# Patient Record
Sex: FEMALE | Race: WHITE | Employment: OTHER | URBAN - METROPOLITAN AREA
[De-identification: names, ages, dates, MRNs, and addresses within clinical notes are randomized per-mention and may not be internally consistent; named-entity substitution may affect disease eponyms.]

---

## 2017-01-31 ENCOUNTER — HOSPITAL ENCOUNTER (OUTPATIENT)
Dept: MAMMOGRAPHY | Age: 72
Discharge: HOME | End: 2017-01-31
Attending: INTERNAL MEDICINE
Payer: MEDICARE

## 2017-01-31 DIAGNOSIS — Z12.31 VISIT FOR SCREENING MAMMOGRAM: ICD-10-CM

## 2017-01-31 PROCEDURE — 77067 SCR MAMMO BI INCL CAD: CPT

## 2017-02-02 ENCOUNTER — HOSPITAL ENCOUNTER (OUTPATIENT)
Dept: LAB | Age: 72
Discharge: HOME | End: 2017-02-02
Payer: MEDICARE

## 2017-02-02 PROCEDURE — 36415 COLL VENOUS BLD VENIPUNCTURE: CPT

## 2017-02-02 PROCEDURE — 84443 ASSAY THYROID STIM HORMONE: CPT

## 2017-02-02 PROCEDURE — 84439 ASSAY OF FREE THYROXINE: CPT

## 2017-02-02 PROCEDURE — 80061 LIPID PANEL: CPT

## 2017-02-02 PROCEDURE — 80053 COMPREHEN METABOLIC PANEL: CPT

## 2017-02-07 ENCOUNTER — OFFICE VISIT (OUTPATIENT)
Dept: INTERNAL MEDICINE CLINIC | Age: 72
End: 2017-02-07

## 2017-02-07 ENCOUNTER — HOSPITAL ENCOUNTER (OUTPATIENT)
Dept: LAB | Age: 72
Discharge: HOME | End: 2017-02-07
Payer: MEDICARE

## 2017-02-07 VITALS
HEIGHT: 63 IN | OXYGEN SATURATION: 98 % | SYSTOLIC BLOOD PRESSURE: 120 MMHG | TEMPERATURE: 99.5 F | RESPIRATION RATE: 16 BRPM | HEART RATE: 79 BPM | BODY MASS INDEX: 36.46 KG/M2 | WEIGHT: 205.8 LBS | DIASTOLIC BLOOD PRESSURE: 64 MMHG

## 2017-02-07 DIAGNOSIS — E03.4 HYPOTHYROIDISM DUE TO ACQUIRED ATROPHY OF THYROID: ICD-10-CM

## 2017-02-07 DIAGNOSIS — Z00.00 ROUTINE GENERAL MEDICAL EXAMINATION AT A HEALTH CARE FACILITY: ICD-10-CM

## 2017-02-07 DIAGNOSIS — Z00.00 MEDICARE ANNUAL WELLNESS VISIT, SUBSEQUENT: Primary | ICD-10-CM

## 2017-02-07 DIAGNOSIS — Z13.39 SCREENING FOR ALCOHOLISM: ICD-10-CM

## 2017-02-07 DIAGNOSIS — E78.2 MIXED HYPERLIPIDEMIA: ICD-10-CM

## 2017-02-07 DIAGNOSIS — F34.1 DYSTHYMIA: ICD-10-CM

## 2017-02-07 DIAGNOSIS — J06.9 VIRAL UPPER RESPIRATORY TRACT INFECTION: ICD-10-CM

## 2017-02-07 DIAGNOSIS — Z71.89 ACP (ADVANCE CARE PLANNING): ICD-10-CM

## 2017-02-07 PROCEDURE — 88175 CYTOPATH C/V AUTO FLUID REDO: CPT | Performed by: INTERNAL MEDICINE

## 2017-02-07 RX ORDER — CODEINE PHOSPHATE AND GUAIFENESIN 10; 100 MG/5ML; MG/5ML
5 SOLUTION ORAL
Qty: 120 ML | Refills: 0 | Status: SHIPPED | OUTPATIENT
Start: 2017-02-07 | End: 2017-04-11

## 2017-02-07 RX ORDER — AZITHROMYCIN 250 MG/1
250 TABLET, FILM COATED ORAL SEE ADMIN INSTRUCTIONS
Qty: 6 TAB | Refills: 0 | Status: SHIPPED | OUTPATIENT
Start: 2017-02-07 | End: 2017-02-10 | Stop reason: SDUPTHER

## 2017-02-07 NOTE — PROGRESS NOTES
HISTORY OF PRESENT ILLNESS  Kaleigh Haywood is a 73 y.o. female. HPI  Dysthymia- cont current dose of lexapro doing well stable   SUBJECTIVE: Kaleigh Haywood is a 73 y.o. female here for follow up of hypothyroidism. Lab Results   Component Value Date/Time    TSH 1.510 02/02/2017 12:36 PM     Thyroid ROS: denies fatigue, weight changes, heat/cold intolerance, bowel/skin changes or CVS symptoms. She started feeling sick around [place] then got better- then went on a cruise and came back now has sinus congestion,chest congestion- no fever or chills- has some ear pain    Subjective: Kaleigh Haywood is a 73 y.o. female with hyperlipidemia. Cardiovascular risk analysis - 73 y.o. female LDL goal is under 130. ROS: taking medications as instructed, no medication side effects noted, no TIA's, no chest pain on exertion, no dyspnea on exertion, no swelling of ankles. Tolerating meds, no myalgias or other side effects noted  New concerns: stable. Review of Systems   Constitutional: Negative for chills, diaphoresis, fever, malaise/fatigue and weight loss. HENT: Negative for congestion, ear pain, hearing loss, sore throat and tinnitus. Eyes: Negative for blurred vision and double vision. Respiratory: Negative for cough, hemoptysis, sputum production, shortness of breath and wheezing. Cardiovascular: Negative for chest pain, palpitations, orthopnea, claudication, leg swelling and PND. Gastrointestinal: Negative for abdominal pain, blood in stool, constipation, diarrhea, heartburn, nausea and vomiting. Genitourinary: Negative for dysuria, flank pain, frequency, hematuria and urgency. Musculoskeletal: Negative for back pain, joint pain, myalgias and neck pain. Skin: Negative. Neurological: Negative for dizziness, tingling, sensory change, speech change, focal weakness, seizures, loss of consciousness, weakness and headaches.    Endo/Heme/Allergies: Negative for environmental allergies and polydipsia. Does not bruise/bleed easily. Psychiatric/Behavioral: Negative for depression, memory loss, substance abuse and suicidal ideas. The patient is not nervous/anxious and does not have insomnia. Physical Exam   Constitutional: She is oriented to person, place, and time. She appears well-developed and well-nourished. HENT:   Head: Normocephalic and atraumatic. Right Ear: External ear normal.   Left Ear: External ear normal.   Nose: Nose normal.   Mouth/Throat: Oropharynx is clear and moist.   Eyes: Conjunctivae and EOM are normal. Pupils are equal, round, and reactive to light. Neck: Normal range of motion. Neck supple. Carotid bruit is not present. No thyromegaly present. Cardiovascular: Normal rate, regular rhythm, S1 normal, S2 normal, normal heart sounds and intact distal pulses. Pulmonary/Chest: Effort normal and breath sounds normal.   Abdominal: Soft. Normal appearance and bowel sounds are normal. There is no hepatosplenomegaly. There is no tenderness. Musculoskeletal: Normal range of motion. Neurological: She is alert and oriented to person, place, and time. Skin: Skin is warm, dry and intact. Psychiatric: She has a normal mood and affect. Her behavior is normal. Judgment and thought content normal.   Nursing note and vitals reviewed. ASSESSMENT and PLAN  Katherine Edmondson was seen today for annual wellness visit and cough.     Diagnoses and all orders for this visit:    Medicare annual wellness visit, subsequent  -     PAP (IMAGE GUIDED), LIQUID-BASED    Mixed hyperlipidemia  -     METABOLIC PANEL, COMPREHENSIVE  -     LIPID PANEL    Dysthymia- doing well on this dose but she isnot feeling well right now    Hypothyroidism due to acquired atrophy of thyroid  -     TSH 3RD GENERATION  -     T4, FREE    Routine general medical examination at a health care facility  -     PAP (IMAGE GUIDED), LIQUID-BASED    Screening for alcoholism    ACP (advance care planning)    Viral upper respiratory tract infection- she has wheeze, fever and fatigue- seems more bacterial as a cause will treat and she will let me know how she is doing  -     azithromycin (ZITHROMAX) 250 mg tablet; Take 1 Tab by mouth See Admin Instructions for 5 days.  -     guaiFENesin-codeine (ROBITUSSIN AC) 100-10 mg/5 mL solution; Take 5 mL by mouth three (3) times daily as needed for Cough. Max Daily Amount: 15 mL. -     CBC W/O DIFF      lab results and schedule of future lab studies reviewed with patient  reviewed diet, exercise and weight control  cardiovascular risk and specific lipid/LDL goals reviewed  reviewed medications and side effects in detail    This is a Subsequent Medicare Annual Wellness Visit providing Personalized Prevention Plan Services (PPPS) (Performed 12 months after initial AWV and PPPS )    I have reviewed the patient's medical history in detail and updated the computerized patient record. History     Past Medical History   Diagnosis Date    Cancer ([facility] [place] [address]      thyroid    Depression     Hyperlipidemia     Unspecified hypothyroidism 2009     thyroid cancer      Past Surgical History   Procedure Laterality Date    Pr thyroidectomy      Hx heent       thyroid    Hx  section      Hx cyst incision and drainage Left      neg     Current Outpatient Prescriptions   Medication Sig Dispense Refill    levothyroxine (SYNTHROID) 175 mcg tablet TAKE ONE TABLET BY MOUTH DAILY BEFORE BREAKFAST 90 Tab 0    naproxen sodium (NAPROSYN) 220 mg tablet Take 220 mg by mouth two (2) times daily (with meals).  escitalopram oxalate (LEXAPRO) 20 mg tablet TAKE ONE TABLET BY MOUTH DAILY 90 Tab 0    atorvastatin (LIPITOR) 10 mg tablet Take 1 Tab by mouth daily. 90 Tab 1    tolterodine ER (DETROL LA) 4 mg ER capsule Take 1 Cap by mouth every other day. 90 Cap 1    phentermine (ADIPEX-P) 37.5 mg tablet Take 1 Tab by mouth every morning.  Max Daily Amount: 37.5 mg. 30 Tab 3  escitalopram oxalate (LEXAPRO) 20 mg tablet Take 1 Tab by mouth daily. 90 Tab 1    multivitamin (ONE A DAY) tablet Take 1 tablet by mouth daily.  cetirizine (ZYRTEC) 10 mg tablet Take  by mouth daily.  fluticasone (FLONASE) 50 mcg/actuation nasal spray 2 Sprays by Both Nostrils route daily. 1 Bottle 4     Allergies   Allergen Reactions    Zetia [Ezetimibe] Rash     Family History   Problem Relation Age of Onset    Cancer Mother     Arthritis-osteo Mother     Breast Cancer Mother [de-identified]    Cancer Sister     Thyroid Disease Sister     Cancer Brother     Heart Failure Maternal Grandmother     Heart Failure Maternal Grandfather     Heart Failure Father      Social History   Substance Use Topics    Smoking status: Never Smoker    Smokeless tobacco: Never Used    Alcohol use 4.2 oz/week     7 Glasses of wine per week     Patient Active Problem List   Diagnosis Code    Depression F32.9    Hypothyroidism E03.9    Hyperlipidemia E78.5    ACP (advance care planning) Z71.89       Depression Risk Factor Screening:     PHQ 2 / 9, over the last two weeks 9/28/2015   Little interest or pleasure in doing things Not at all   Feeling down, depressed or hopeless Not at all   Total Score PHQ 2 0     Alcohol Risk Factor Screening: On any occasion during the past 3 months, have you had more than 3 drinks containing alcohol? Yes    Do you average more than 7 drinks per week? No      Functional Ability and Level of Safety:     Hearing Loss   normal-to-mild    Activities of Daily Living   Self-care. Requires assistance with: no ADLs    Fall Risk     Fall Risk Assessment, last 12 mths 8/9/2016   Able to walk? Yes   Fall in past 12 months? Yes   Fall with injury? No   Number of falls in past 12 months 1   Fall Risk Score 1     Abuse Screen   Patient is not abused    Review of Systems   A comprehensive review of systems was negative except for that written in the HPI.     Physical Examination     Evaluation of Cognitive Function:  Mood/affect:  neutral  Appearance: age appropriate  Family member/caregiver input: doing well  [address] [facility]      Patient Care Team:  Stephanie Bhagat MD as PCP - General    Advice/Referrals/Counseling   Education and counseling provided:  Are appropriate based on today's review and evaluation  End-of-Life planning (with patient's consent)  Pneumococcal Vaccine    Assessment/Plan   Patricia Nam was seen today for annual wellness visit and cough. Diagnoses and all orders for this visit:    Medicare annual wellness visit, subsequent  -     PAP (IMAGE GUIDED), LIQUID-BASED    Mixed hyperlipidemia- tolerating medicine  -     METABOLIC PANEL, COMPREHENSIVE  -     LIPID PANEL    Dysthymia-stable on medicine    Hypothyroidism due to acquired atrophy of thyroid  -     TSH 3RD GENERATION  -     T4, FREE    Routine general medical examination at a health care facility  -     PAP (IMAGE GUIDED), LIQUID-BASED    Screening for alcoholism    ACP (advance care planning)    Viral upper respiratory tract infection  -     azithromycin (ZITHROMAX) 250 mg tablet; Take 1 Tab by mouth See Admin Instructions for 5 days.  -     guaiFENesin-codeine (ROBITUSSIN AC) 100-10 mg/5 mL solution; Take 5 mL by mouth three (3) times daily as needed for Cough. Max Daily Amount: 15 mL. -     CBC W/O DIFF      lab results and schedule of future lab studies reviewed with patient  reviewed diet, exercise and weight control  cardiovascular risk and specific lipid/LDL goals reviewed  reviewed medications and side effects in detail.

## 2017-02-07 NOTE — ACP (ADVANCE CARE PLANNING)
Advance Care Planning (ACP) Provider Note - Comprehensive     Date of ACP Conversation: 02/07/17  Persons included in Conversation:  patient  No flowsheet data found. Discussed with patient- she has an advanced directive- done with - she will get me a copy of it from home.  is medical power of .  SHe is a full code

## 2017-02-09 ENCOUNTER — TELEPHONE (OUTPATIENT)
Dept: INTERNAL MEDICINE CLINIC | Age: 72
End: 2017-02-09

## 2017-02-09 NOTE — TELEPHONE ENCOUNTER
Lilly Armstrong from [facility] lab received a pap from patient. The ICD10 code was incorrect and insurance denied.   Call Lilly Armstrong back at [phone]

## 2017-02-10 DIAGNOSIS — J06.9 VIRAL UPPER RESPIRATORY TRACT INFECTION: ICD-10-CM

## 2017-02-10 NOTE — TELEPHONE ENCOUNTER
Marino Bartlett states she is still not well and Dr. Judie Prakash told her to call in and get another prescription.

## 2017-02-11 RX ORDER — AZITHROMYCIN 250 MG/1
TABLET, FILM COATED ORAL
Qty: 6 TAB | Refills: 0 | Status: SHIPPED | OUTPATIENT
Start: 2017-02-11 | End: 2017-04-11 | Stop reason: ALTCHOICE

## 2017-02-13 RX ORDER — AZITHROMYCIN 250 MG/1
250 TABLET, FILM COATED ORAL SEE ADMIN INSTRUCTIONS
Qty: 6 TAB | Refills: 0 | OUTPATIENT
Start: 2017-02-13 | End: 2017-02-18

## 2017-04-11 ENCOUNTER — OFFICE VISIT (OUTPATIENT)
Dept: INTERNAL MEDICINE CLINIC | Age: 72
End: 2017-04-11

## 2017-04-11 VITALS
BODY MASS INDEX: 37 KG/M2 | HEIGHT: 63 IN | RESPIRATION RATE: 16 BRPM | DIASTOLIC BLOOD PRESSURE: 53 MMHG | TEMPERATURE: 98.1 F | OXYGEN SATURATION: 98 % | WEIGHT: 208.8 LBS | SYSTOLIC BLOOD PRESSURE: 111 MMHG | HEART RATE: 73 BPM

## 2017-04-11 DIAGNOSIS — E78.2 MIXED HYPERLIPIDEMIA: ICD-10-CM

## 2017-04-11 DIAGNOSIS — Z01.810 PREOP CARDIOVASCULAR EXAM: ICD-10-CM

## 2017-04-11 DIAGNOSIS — E03.4 HYPOTHYROIDISM DUE TO ACQUIRED ATROPHY OF THYROID: Primary | ICD-10-CM

## 2017-04-11 NOTE — PROGRESS NOTES
HISTORY OF PRESENT ILLNESS  Maxi Escalante is a 73 y.o. female. HPI     She presents today for a pre op examination. Pt is scheduled to have eye surgery. Pt denies chest pain, shortness of breath, fever, chills, or body aches. She denies dysuria, urgency, or frequency. Pt denies issues with latex or anesthesia in the past.    Reports she had vertigo last week when she sat up after doing yoga. She states that this lasted for a few days and she did not take any medication for this. Hypothyroidism:  Lab Results   Component Value Date/Time    TSH 1.510 02/02/2017 12:36 PM   Thyroid ROS: denies fatigue, weight changes, heat/cold intolerance, bowel/skin changes or CVS symptoms. Hyperlipidemia:  Cardiovascular risk analysis - 73 y.o. female LDL goal is under 130. ROS: taking medications as instructed, no medication side effects noted, no TIA's, no chest pain on exertion, no dyspnea on exertion, no swelling of ankles. Tolerating meds, no myalgias or other side effects noted  New concerns: Stable. Review of Systems   All other systems reviewed and are negative. Physical Exam   Constitutional: She is oriented to person, place, and time. She appears well-developed and well-nourished. HENT:   Head: Normocephalic and atraumatic. Right Ear: External ear normal.   Left Ear: External ear normal.   Nose: Nose normal.   Mouth/Throat: Oropharynx is clear and moist.   Eyes: Conjunctivae and EOM are normal.   Neck: Normal range of motion. Neck supple. Carotid bruit is not present. No thyroid mass and no thyromegaly present. Cardiovascular: Normal rate, regular rhythm, S1 normal, S2 normal, normal heart sounds and intact distal pulses. Pulmonary/Chest: Effort normal and breath sounds normal.   Abdominal: Soft. Normal appearance and bowel sounds are normal. There is no hepatosplenomegaly. There is no tenderness. Musculoskeletal: Normal range of motion.    Neurological: She is alert and oriented to person, place, and time. She has normal strength. No cranial nerve deficit or sensory deficit. Coordination normal.   Skin: Skin is warm, dry and intact. No abrasion and no rash noted. Psychiatric: She has a normal mood and affect. Her behavior is normal. Judgment and thought content normal.   Nursing note and vitals reviewed. ASSESSMENT and PLAN  Nemesio Olsen was seen today for pre-op exam.    Diagnoses and all orders for this visit:    Preop cardiovascular exam  BP at goal and pt has no other symptoms- she is clear to have eye surgery performed. Hypothyroidism due to acquired atrophy of thyroid  Stable-Continue current medications. -     CBC W/O DIFF  -     TSH 3RD GENERATION  -     T4, FREE    Mixed hyperlipidemia  Stable- Continue current medications.  -     LIPID PANEL  -     METABOLIC PANEL, COMPREHENSIVE    I provided pt with lab slips and she will do these in a few months. reviewed medications and side effects in detail     Written by Anabella Caicedo, as dictated by Tiffany Ballesteros MD.     Current diagnosis and concerns discussed with pt at length. Understands risks and benefits or current treatment plan and medications and accepts the treatment and medication with any possible risks. Pt asks appropriate questions which were answered. Pt instructed to call with any concerns or problems.

## 2017-04-11 NOTE — PROGRESS NOTES
Chief Complaint   Patient presents with    Pre-op Exam     eye surg.      Pt states she had vertigo last week

## 2017-04-12 DIAGNOSIS — E03.9 ACQUIRED HYPOTHYROIDISM: ICD-10-CM

## 2017-04-13 RX ORDER — LEVOTHYROXINE SODIUM 175 UG/1
TABLET ORAL
Qty: 90 TAB | Refills: 1 | Status: SHIPPED | OUTPATIENT
Start: 2017-04-13 | End: 2017-11-01 | Stop reason: SDUPTHER

## 2017-04-13 RX ORDER — ESCITALOPRAM OXALATE 20 MG/1
TABLET ORAL
Qty: 90 TAB | Refills: 0 | Status: SHIPPED | OUTPATIENT
Start: 2017-04-13 | End: 2017-07-17 | Stop reason: SDUPTHER

## 2017-07-27 ENCOUNTER — HOSPITAL ENCOUNTER (OUTPATIENT)
Dept: LAB | Age: 72
Discharge: HOME | End: 2017-07-27
Payer: MEDICARE

## 2017-07-27 PROCEDURE — 36415 COLL VENOUS BLD VENIPUNCTURE: CPT

## 2017-07-27 PROCEDURE — 84439 ASSAY OF FREE THYROXINE: CPT

## 2017-07-27 PROCEDURE — 80053 COMPREHEN METABOLIC PANEL: CPT

## 2017-07-27 PROCEDURE — 85027 COMPLETE CBC AUTOMATED: CPT

## 2017-07-27 PROCEDURE — 83036 HEMOGLOBIN GLYCOSYLATED A1C: CPT

## 2017-07-27 PROCEDURE — 84443 ASSAY THYROID STIM HORMONE: CPT

## 2017-07-27 PROCEDURE — 80061 LIPID PANEL: CPT

## 2017-07-28 LAB
ALBUMIN SERPL-MCNC: 4.3 G/DL (ref 3.6–4.8)
ALBUMIN/GLOB SERPL: 1.8 {RATIO} (ref 1.2–2.2)
ALP SERPL-CCNC: 96 IU/L (ref 39–117)
ALT SERPL-CCNC: 23 IU/L (ref 0–32)
AST SERPL-CCNC: 19 IU/L (ref 0–40)
BILIRUB SERPL-MCNC: 0.8 MG/DL (ref 0–1.2)
BUN SERPL-MCNC: 14 MG/DL (ref 8–27)
BUN/CREAT SERPL: 16 (ref 12–28)
CALCIUM SERPL-MCNC: 9.2 MG/DL (ref 8.7–10.3)
CHLORIDE SERPL-SCNC: 103 MMOL/L (ref 96–106)
CHOLEST SERPL-MCNC: 176 MG/DL (ref 100–199)
CO2 SERPL-SCNC: 25 MMOL/L (ref 18–29)
CREAT SERPL-MCNC: 0.88 MG/DL (ref 0.57–1)
ERYTHROCYTE [DISTWIDTH] IN BLOOD BY AUTOMATED COUNT: 13.7 % (ref 12.3–15.4)
GLOBULIN SER CALC-MCNC: 2.4 G/DL (ref 1.5–4.5)
GLUCOSE SERPL-MCNC: 103 MG/DL (ref 65–99)
HCT VFR BLD AUTO: 41.2 % (ref 34–46.6)
HDLC SERPL-MCNC: 66 MG/DL
HGB BLD-MCNC: 13.8 G/DL (ref 11.1–15.9)
INTERPRETATION, 910389: NORMAL
LDLC SERPL CALC-MCNC: 90 MG/DL (ref 0–99)
MCH RBC QN AUTO: 29.4 PG (ref 26.6–33)
MCHC RBC AUTO-ENTMCNC: 33.5 G/DL (ref 31.5–35.7)
MCV RBC AUTO: 88 FL (ref 79–97)
PLATELET # BLD AUTO: 269 X10E3/UL (ref 150–379)
POTASSIUM SERPL-SCNC: 4.4 MMOL/L (ref 3.5–5.2)
PROT SERPL-MCNC: 6.7 G/DL (ref 6–8.5)
RBC # BLD AUTO: 4.69 X10E6/UL (ref 3.77–5.28)
SODIUM SERPL-SCNC: 141 MMOL/L (ref 134–144)
T4 FREE SERPL-MCNC: 1.55 NG/DL (ref 0.82–1.77)
TRIGL SERPL-MCNC: 98 MG/DL (ref 0–149)
TSH SERPL DL<=0.005 MIU/L-ACNC: 1.55 UIU/ML (ref 0.45–4.5)
VLDLC SERPL CALC-MCNC: 20 MG/DL (ref 5–40)
WBC # BLD AUTO: 7.6 X10E3/UL (ref 3.4–10.8)

## 2017-08-03 LAB
HBA1C MFR BLD: 5.9 % (ref 4.8–5.6)
SPECIMEN STATUS REPORT, ROLRST: NORMAL

## 2017-08-17 ENCOUNTER — OFFICE VISIT (OUTPATIENT)
Dept: INTERNAL MEDICINE CLINIC | Age: 72
End: 2017-08-17

## 2017-08-17 ENCOUNTER — HOSPITAL ENCOUNTER (OUTPATIENT)
Dept: GENERAL RADIOLOGY | Age: 72
Discharge: HOME | End: 2017-08-17
Attending: INTERNAL MEDICINE
Payer: MEDICARE

## 2017-08-17 VITALS
WEIGHT: 220 LBS | BODY MASS INDEX: 38.98 KG/M2 | TEMPERATURE: 97.6 F | HEIGHT: 63 IN | HEART RATE: 61 BPM | SYSTOLIC BLOOD PRESSURE: 120 MMHG | DIASTOLIC BLOOD PRESSURE: 56 MMHG | OXYGEN SATURATION: 98 % | RESPIRATION RATE: 12 BRPM

## 2017-08-17 DIAGNOSIS — H83.2X9 VESTIBULAR DISEQUILIBRIUM, UNSPECIFIED LATERALITY: ICD-10-CM

## 2017-08-17 DIAGNOSIS — R06.02 SOB (SHORTNESS OF BREATH): ICD-10-CM

## 2017-08-17 DIAGNOSIS — M25.50 ARTHRALGIA, UNSPECIFIED JOINT: ICD-10-CM

## 2017-08-17 DIAGNOSIS — E78.2 MIXED HYPERLIPIDEMIA: Primary | ICD-10-CM

## 2017-08-17 DIAGNOSIS — F34.1 DYSTHYMIA: ICD-10-CM

## 2017-08-17 DIAGNOSIS — E03.4 HYPOTHYROIDISM DUE TO ACQUIRED ATROPHY OF THYROID: ICD-10-CM

## 2017-08-17 PROCEDURE — 71020 XR CHEST PA LAT: CPT

## 2017-08-17 RX ORDER — NAPROXEN SODIUM 220 MG
220 TABLET ORAL 2 TIMES DAILY WITH MEALS
COMMUNITY

## 2017-08-17 NOTE — PROGRESS NOTES
HISTORY OF PRESENT ILLNESS  Jeane Wallace is a 73 y.o. female. HPI   Presents today for 6 month follow up. Patient reports she has not followed up with dermatology. Patient admits to some vertigo when switching from laying down to standing up. Patient reports she went to [facility] for UTI and chest XR. She states she had an infection. Patient reports she was given a breathing treatment and an inhaler. She states she is back on the inhaler. Patient feels like she is getting better. She states being sick took a lot of energy from her. She notes she is having some SOB and coughs. Patient reports she is noticing arthritis in her hands, knees, and heels. Subjective: Jeane Wallace is a 73 y.o. female with hyperlipidemia. Cardiovascular risk analysis - 73 y.o. female LDL goal is under 130. ROS: taking medications as instructed, no medication side effects noted, no TIA's, no chest pain on exertion, no dyspnea on exertion, no swelling of ankles. Tolerating meds, no myalgias or other side effects noted  New concerns: stable  SUBJECTIVE: Jeane Wallace is a 73 y.o. female here for follow up of hypothyroidism. Lab Results   Component Value Date/Time    TSH 1.550 07/27/2017 08:21 AM     Thyroid ROS: denies fatigue, weight changes, heat/cold intolerance, bowel/skin changes or CVS symptoms. .   Dysthymia- mood is doing well on medicine. She is travelling for her bday and [place]- overall feeling well with exercise  And eating right and taking care of herself- helps just as much with her mood    Review of Systems   All other systems reviewed and are negative. Physical Exam   Constitutional: She is oriented to person, place, and time. She appears well-developed and well-nourished. HENT:   Head: Normocephalic and atraumatic.    Right Ear: External ear normal.   Left Ear: External ear normal.   Nose: Nose normal.   Mouth/Throat: Oropharynx is clear and moist.   Eyes: Conjunctivae and EOM are normal.   Neck: Normal range of motion. Neck supple. Carotid bruit is not present. No thyroid mass and no thyromegaly present. Cardiovascular: Normal rate, regular rhythm, S1 normal, S2 normal, normal heart sounds and intact distal pulses. Pulmonary/Chest: Effort normal and breath sounds normal.   Abdominal: Soft. Normal appearance and bowel sounds are normal. There is no hepatosplenomegaly. There is no tenderness. Musculoskeletal: Normal range of motion. Neurological: She is alert and oriented to person, place, and time. She has normal strength. No cranial nerve deficit or sensory deficit. Coordination normal.   Skin: Skin is warm, dry and intact. No abrasion and no rash noted. Psychiatric: She has a normal mood and affect. Her behavior is normal. Judgment and thought content normal.   Nursing note and vitals reviewed. ASSESSMENT and PLAN  Diagnoses and all orders for this visit:    1. Mixed hyperlipidemia  Cholesterol stable. I do not recommend a change in medication. 2. Hypothyroidism due to acquired atrophy of thyroid  Thyroid stable. I do not recommend a change in medications. 3. Dysthymia  Mood stable. I do not recommend any changes in medication. 4. Vestibular disequilibrium, unspecified laterality  Patient notes positional dizziness from laying down to standing. She will continue to watch. It is not all the time and she is exercising three days a week and feeling strong     5. SOB (shortness of breath)  Patient has been having SOB since fighting an infection. Ordered XR. She is getting better and occasionally needs an inhaler but not all the time   -     XR CHEST PA LAT; Future    6. Arthralgia, unspecified joint  Discussed patient she can take Aleve, but we will need to monitor her kidney function.       lab results and schedule of future lab studies reviewed with patient  reviewed diet, exercise and weight control    Written by Evert Castaneda Heather, as dictated by Cristina Alva MD.     Current diagnosis and concerns discussed with pt at length. Understands risks and benefits or current treatment plan and medications and accepts the treatment and medication with any possible risks. Pt asks appropriate questions which were answered. Pt instructed to call with any concerns or problems.

## 2017-08-18 ENCOUNTER — TELEPHONE (OUTPATIENT)
Dept: INTERNAL MEDICINE CLINIC | Age: 72
End: 2017-08-18

## 2017-08-18 NOTE — LETTER
8/30/2017 11:36 AM 
 
 
 
 
 
 
Ms. Marc Douglass [address] [facility] [place] [id number] Sivan Kim: 
 
I attempted to contact you on August 18th in reference to your result of chest xray. Your results were normal. 
 
Thank you, 
 
 
 
Hayden ALFONSO

## 2017-09-25 ENCOUNTER — OFFICE VISIT (OUTPATIENT)
Dept: INTERNAL MEDICINE CLINIC | Age: 72
End: 2017-09-25

## 2017-09-25 VITALS
RESPIRATION RATE: 14 BRPM | OXYGEN SATURATION: 98 % | WEIGHT: 218.2 LBS | DIASTOLIC BLOOD PRESSURE: 67 MMHG | HEART RATE: 76 BPM | HEIGHT: 63 IN | BODY MASS INDEX: 38.66 KG/M2 | TEMPERATURE: 98.7 F | SYSTOLIC BLOOD PRESSURE: 128 MMHG

## 2017-09-25 DIAGNOSIS — E78.2 MIXED HYPERLIPIDEMIA: ICD-10-CM

## 2017-09-25 DIAGNOSIS — E03.4 HYPOTHYROIDISM DUE TO ACQUIRED ATROPHY OF THYROID: ICD-10-CM

## 2017-09-25 DIAGNOSIS — R06.02 SOB (SHORTNESS OF BREATH): Primary | ICD-10-CM

## 2017-09-25 NOTE — PROGRESS NOTES
HISTORY OF PRESENT ILLNESS  Litzy Langley is a 81 y.o. female. HPI   SOB: Patient reports SOB when going up stairs and other activity. Patient reports she has cut down on exercise since noticing SOB. She states this is an acute change. She states she has trouble taking deep breaths. She states she coughs a lot during the night. She states the coughs feel deep. She states she has been fatigued and wants to rest more. Patient notes this started in August. She denies night sweats. FH: Patient father had 3 heart attacks in life span. Hyperlipidemia:  Cardiovascular risk analysis - 81 y.o. female LDL goal is under 130. ROS: taking medications as instructed, no medication side effects noted, no TIA's, no chest pain on exertion, no dyspnea on exertion, no swelling of ankles. Tolerating meds, no myalgias or other side effects noted  New concerns: Last LDL was 90 and cholesterol total was 176 on 7/27/2017.    hypothyroidism. Lab Results   Component Value Date/Time    TSH 1.550 07/27/2017 08:21 AM     Thyroid ROS: denies fatigue, weight changes, heat/cold intolerance, bowel/skin changes or CVS symptoms. Review of Systems   All other systems reviewed and are negative. Physical Exam   Constitutional: She is oriented to person, place, and time. She appears well-developed and well-nourished. HENT:   Head: Normocephalic and atraumatic. Right Ear: External ear normal.   Left Ear: External ear normal.   Nose: Nose normal.   Mouth/Throat: Oropharynx is clear and moist.   Eyes: Conjunctivae and EOM are normal.   Neck: Normal range of motion. Neck supple. Carotid bruit is not present. No thyroid mass and no thyromegaly present. Cardiovascular: Normal rate, regular rhythm, S1 normal, S2 normal, normal heart sounds and intact distal pulses. Pulmonary/Chest: Effort normal and breath sounds normal.   Abdominal: Soft. Normal appearance and bowel sounds are normal. There is no hepatosplenomegaly.  There is no tenderness. Musculoskeletal: Normal range of motion. Neurological: She is alert and oriented to person, place, and time. She has normal strength. No cranial nerve deficit or sensory deficit. Coordination normal.   Skin: Skin is warm, dry and intact. No abrasion and no rash noted. Psychiatric: She has a normal mood and affect. Her behavior is normal. Judgment and thought content normal.   Nursing note and vitals reviewed. ASSESSMENT and PLAN  Diagnoses and all orders for this visit:    1. SOB (shortness of breath)  Patient will follow up with cardiology. Ordered CT of chest to rule out infection. Ordered pulmonary function test to rule out abnormality with oxygenation levels. -     PULMONARY FUNCTION TEST; Future  -     CT CHEST W CONT; Future    2. Hypothyroidism due to acquired atrophy of thyroid  Thyroid stable. I do not recommend a change in medications. 3. Mixed hyperlipidemia  Stable, patient tolerating meds, no myalgias. I do not recommend any change in medications. lab results and schedule of future lab studies reviewed with patient  reviewed diet, exercise and weight control    Written by Carrie Cornell, as dictated by Kirill Webber MD.     Current diagnosis and concerns discussed with pt at length. Understands risks and benefits or current treatment plan and medications and accepts the treatment and medication with any possible risks. Pt asks appropriate questions which were answered. Pt instructed to call with any concerns or problems.

## 2017-09-27 ENCOUNTER — TELEPHONE (OUTPATIENT)
Dept: INTERNAL MEDICINE CLINIC | Age: 72
End: 2017-09-27

## 2017-09-27 NOTE — TELEPHONE ENCOUNTER
Obtained appt with Dr. Wayne Hunter for Monday Oct 5 at [facility] at 9:20. L/vm to advise pt and requested a return message to verify she had received appt info.

## 2017-10-02 ENCOUNTER — HOSPITAL ENCOUNTER (OUTPATIENT)
Dept: PULMONOLOGY | Age: 72
Discharge: HOME | End: 2017-10-02
Attending: INTERNAL MEDICINE
Payer: MEDICARE

## 2017-10-02 ENCOUNTER — OFFICE VISIT (OUTPATIENT)
Dept: CARDIOLOGY CLINIC | Age: 72
End: 2017-10-02

## 2017-10-02 ENCOUNTER — HOSPITAL ENCOUNTER (OUTPATIENT)
Dept: CT IMAGING | Age: 72
Discharge: HOME | End: 2017-10-02
Attending: INTERNAL MEDICINE
Payer: MEDICARE

## 2017-10-02 VITALS
HEART RATE: 75 BPM | BODY MASS INDEX: 38.41 KG/M2 | DIASTOLIC BLOOD PRESSURE: 70 MMHG | HEIGHT: 63 IN | WEIGHT: 216.8 LBS | OXYGEN SATURATION: 99 % | SYSTOLIC BLOOD PRESSURE: 110 MMHG

## 2017-10-02 VITALS — HEART RATE: 65 BPM

## 2017-10-02 DIAGNOSIS — R06.02 SHORTNESS OF BREATH: ICD-10-CM

## 2017-10-02 DIAGNOSIS — R06.02 SOB (SHORTNESS OF BREATH): ICD-10-CM

## 2017-10-02 DIAGNOSIS — R06.09 DOE (DYSPNEA ON EXERTION): Primary | ICD-10-CM

## 2017-10-02 DIAGNOSIS — E78.5 DYSLIPIDEMIA: ICD-10-CM

## 2017-10-02 DIAGNOSIS — R07.89 CHEST PRESSURE: ICD-10-CM

## 2017-10-02 PROCEDURE — 71260 CT THORAX DX C+: CPT

## 2017-10-02 PROCEDURE — 74011000250 HC RX REV CODE- 250: Performed by: INTERNAL MEDICINE

## 2017-10-02 PROCEDURE — 94060 EVALUATION OF WHEEZING: CPT

## 2017-10-02 PROCEDURE — 74011636320 HC RX REV CODE- 636/320: Performed by: RADIOLOGY

## 2017-10-02 RX ORDER — ALBUTEROL SULFATE 90 UG/1
AEROSOL, METERED RESPIRATORY (INHALATION)
COMMUNITY
End: 2018-08-10 | Stop reason: ALTCHOICE

## 2017-10-02 RX ORDER — ALBUTEROL SULFATE 0.83 MG/ML
2.5 SOLUTION RESPIRATORY (INHALATION)
Status: COMPLETED | OUTPATIENT
Start: 2017-10-02 | End: 2017-10-02

## 2017-10-02 RX ADMIN — ALBUTEROL SULFATE 2.5 MG: 2.5 SOLUTION RESPIRATORY (INHALATION) at 11:06

## 2017-10-02 RX ADMIN — IOPAMIDOL 100 ML: 612 INJECTION, SOLUTION INTRAVENOUS at 11:37

## 2017-10-02 NOTE — PROGRESS NOTES
[address]  [address]  [place], [address] [facility]    Office [phone]  Fax [phone]  Cell [phone]        Darrel Isaacs is a 81 y.o. female referred for evaluation of CRUZ. Consult requested by Lyly Rodriguez MD.      Assessment  Encounter Diagnoses   Name Primary?  Shortness of breath     Dyslipidemia     Chest pressure     CRUZ (dyspnea on exertion) Yes       Recommendations: Darrel Isaacs has persistent CRUZ following episode of \"pnemonia\" some weeks ago. She has atypical chest pain and persistent cough. Her exam is normal with clear lung fields and no obvious murmurs. Her EKG is essentially normal. Cardiacwise, the differential diagnosis includes viral myocarditis, CAD. I doubt she has rheumatic valvular disease. Will evaluate further with echo and exercise cardiolite. Simultaneous pulmonary evaluation is underway. Phone follow up after reviewing tests    Subjective:  She was seen by Dr. Nigel Shaw in . Apparently cardiac cath performed in [place] in  was normal. She has dyslipidemia on statin therapy but no hx of HTN or DM. Patient has noticed CRUZ and fatigue on exertion since July. She also reports chest heaviness while exercising. She formerly was exercising three times a week but recently has not had enough energy. Patient denies any exertional chest pain, palpitations, syncope, orthopnea, edema or paroxysmal nocturnal dyspnea. She notes she went to [facility] for a bladder infection a few weeks ago, had a CXR and was informed she had pneumonia. She continues to have a residual, productive cough. She also notes she had rheumatic fever when she was 7. Father had 3 MI before dying at age 58    Cardiac risk factors   HTN no  DM no  Smoking no  Family hx of CAD yes -father  of 2rd MI at 58    Cardiac testing  No specialty comments available.     Past Medical History:   Diagnosis Date    Cancer ([facility] [address]     thyroid    Depression     Hyperlipidemia     Unspecified hypothyroidism 1/20/2009    thyroid cancer        Current Outpatient Prescriptions   Medication Sig Dispense Refill    albuterol (VENTOLIN HFA) 90 mcg/actuation inhaler Take  by inhalation.  naproxen sodium (ALEVE) 220 mg tablet Take 220 mg by mouth two (2) times daily (with meals).  tolterodine ER (DETROL LA) 4 mg ER capsule TAKE ONE CAPSULE BY MOUTH EVERY OTHER DAY 45 Cap 5    levothyroxine (SYNTHROID) 175 mcg tablet TAKE ONE TABLET BY MOUTH DAILY BEFORE BREAKFAST 90 Tab 1    atorvastatin (LIPITOR) 10 mg tablet Take 1 Tab by mouth daily. (Patient taking differently: Take 10 mg by mouth daily. Every other day) 90 Tab 1    escitalopram (LEXAPRO) 20 mg tablet Take 1 Tab by mouth daily for 90 days. 90 Tab 3       Allergies   Allergen Reactions    Zetia [Ezetimibe] Rash          Review of Systems  Constitutional: Negative for fever, chills, and diaphoresis. Positive for fatigue. Respiratory: Negative for hemoptysis, and wheezing. Positive for CRUZ, productive cough. Cardiovascular: Negative for chest pain, palpitations, orthopnea, claudication, leg swelling and PND. Gastrointestinal: Negative for heartburn, nausea, vomiting, blood in stool and melena. Genitourinary: Negative for dysuria and flank pain. Musculoskeletal: Negative for joint pain and back pain. Skin: Negative for rash. Neurological: Negative for focal weakness, seizures, loss of consciousness, weakness and headaches. Endo/Heme/Allergies: Does not bruise/bleed easily. Psychiatric/Behavioral: Negative for memory loss. The patient does not have insomnia.       Physical Exam    Visit Vitals    /70 (BP 1 Location: Left arm, BP Patient Position: Sitting)    Pulse 75    Ht 5' 3\" (1.6 m)    Wt 216 lb 12.8 oz (98.3 kg)    SpO2 99%    BMI 38.4 kg/m2     Wt Readings from Last 3 Encounters:   10/02/17 216 lb 12.8 oz (98.3 kg)   09/25/17 218 lb 3.2 oz (99 kg) 08/17/17 220 lb (99.8 kg)      General - well developed well nourished  Neck - JVP normal, thyroid nl  Cardiac - normal S1, S2, no murmurs, rubs or gallops.  No clicks  Vascular - carotids without bruits, radials, femorals and pedal pulses equal bilateral  Lungs - clear to auscultation bilaterals, no rales, wheezing or rhonchi  Abd - soft nontender, no HSM, no abd bruits  Extremities - no edema  Skin - no rash  Neuro - nonfocal  Psych - normal mood and affect      Cardiographics  EKG 10/2/17- SR, incomplete RBBB, nonspecific lateral T wave flattening, no significant change from 3/4/13    Written by Silvia Olsen, as dictated by Isabela Hopper MD.  Christina Magana

## 2017-10-02 NOTE — MR AVS SNAPSHOT
Visit Information Date & Time Provider Department Dept. Phone Encounter #  
 10/2/2017  9:20 AM Artur Huerta MD [facility] Yvette Rocha [phone] [id number] Your Appointments 2/8/2018  9:15 AM  
Medicare Physical with Leatha Paige MD  
[facility] [address]) Appt Note: CPE [facility] ELDER 09/21/2017 please send lab forms to [facility]; medicare wellness [address] [facility] [phone]  
[phone]  
  
   
 [address] [facility] [address] Upcoming Health Maintenance Date Due Pneumococcal 65+ Low/Medium Risk (2 of 2 - PPSV23) 3/4/2016 INFLUENZA AGE 9 TO ADULT 8/1/2017 MEDICARE YEARLY EXAM 2/8/2018 BREAST CANCER SCRN MAMMOGRAM 1/31/2019 GLAUCOMA SCREENING Q2Y 5/11/2019 COLONOSCOPY 9/10/2020 DTaP/Tdap/Td series (2 - Td) 9/28/2025 Allergies as of 10/2/2017  Review Complete On: 10/2/2017 By: Pau Santo  
  
 Severity Noted Reaction Type Reactions Zetia [Ezetimibe]  04/11/2011    Rash Current Immunizations  Never Reviewed Name Date H1N1 FLU VACCINE 2/10/2010 Hepatitis A Vaccine 5/21/2009, 11/1/2008 Hepatitis B Vaccine 5/21/2009, 11/1/2008 Influenza Vaccine 11/3/2016 Influenza Vaccine (Quad) PF 9/28/2015 Influenza Vaccine Split 10/3/2012, 10/12/2011, 10/13/2010 Influenza Vaccine Whole 11/1/2008 Pneumococcal Vaccine (Unspecified Type) 3/4/2011 TD Vaccine 11/1/2008 Tdap 9/28/2015 Zoster Vaccine, Live 11/4/2013 Not reviewed this visit You Were Diagnosed With   
  
 Codes Comments Shortness of breath    -  Primary ICD-10-CM: R06.02 
ICD-9-CM: 786.05 Dyslipidemia     ICD-10-CM: E78.5 ICD-9-CM: 272.4 Vitals BP Pulse Height(growth percentile) Weight(growth percentile) SpO2 BMI  
 110/70 (BP 1 Location: Left arm, BP Patient Position: Sitting) 75 5' 3\" (1.6 m) 216 lb 12.8 oz (98.3 kg) 99% 38.4 kg/m2 OB Status Smoking Status Postmenopausal Never Smoker Vitals History BMI and BSA Data Body Mass Index Body Surface Area  
 38.4 kg/m 2 2.09 m 2 Preferred Pharmacy Pharmacy Name Phone [facility] [address] [phone] Your Updated Medication List  
  
   
This list is accurate as of: 10/2/17  9:56 AM.  Always use your most recent med list.  
  
  
  
  
 ALEVE 220 mg tablet Generic drug:  naproxen sodium Take 220 mg by mouth two (2) times daily (with meals). atorvastatin 10 mg tablet Commonly known as:  LIPITOR Take 1 Tab by mouth daily. escitalopram oxalate 20 mg tablet Commonly known as:  Arther Putty Take 1 Tab by mouth daily for 90 days. levothyroxine 175 mcg tablet Commonly known as:  SYNTHROID  
TAKE ONE TABLET BY MOUTH DAILY BEFORE BREAKFAST  
  
 tolterodine ER 4 mg ER capsule Commonly known as:  DETROL LA  
TAKE ONE CAPSULE BY MOUTH EVERY OTHER DAY  
  
 VENTOLIN HFA 90 mcg/actuation inhaler Generic drug:  albuterol Take  by inhalation. We Performed the Following AMB POC EKG ROUTINE W/ 12 LEADS, INTER & REP [54093 CPT(R)] To-Do List   
 10/02/2017 10:30 AM  
(Arrive by 10:00 AM) Appointment with PULMONARY LAB [facility] at [facility] PULMONARY LAB ([phone]) 1. Bring list of current medications or bag medicines. 2. No breathing medicines 6 hours before the procedure. 3. Patient should eat light the day of procedure. 10/02/2017 12:00 PM  
  Appointment with [facility] CT 1 at [facility] RAD CT ([phone]) CONTRAST STUDY: 1. The patient should not eat solid food four hours before the appointment but should be encouraged to drink clear liquids. 2. The patient will require IV access for contrast administration.  3. The patient should not take  Ibuprofen (Advil, Motrin, etc.) and Naproxen Sodium (Aleve, etc.)  on the day of the exam. Stopping non-steroidal anti-inflammatory agents (NSAIDs) like Ibuprofen decreases the risk of kidney damage from the x-ray contrast (dye). 4. Bring any non [facility] facility films/images pertaining to the area of interest with you on the day of appointment. 5. Bring current lab work if available(within last 90 days CMP) ***If scheduled at [facility], iSTAT is not available, labs will need to be done before appointment*** 6. Check in at registration at least 30 minutes before appt time unless you were instructed to do otherwise. 7. If you have to drink oral contrast please pick it up any weekday prior to your appointment, if you cannot please check in 2 hrs  before appt time. Introducing [facility]! Dear Delmi Monahan: Thank you for requesting a [employer] account. Our records indicate that you already have an active [employer] account. You can access your account anytime at [email or web address]. [employer]/[facility] Did you know that you can access your hospital and ER discharge instructions at any time in [employer]? You can also review all of your test results from your hospital stay or ER visit. Additional Information If you have questions, please visit the Frequently Asked Questions section of the [employer] website at [email or web address]. [email or web address]/. Remember, [employer] is NOT to be used for urgent needs. For medical emergencies, dial 911. Now available from your iPhone and Android! Please provide this summary of care documentation to your next provider. Your primary care clinician is listed as Kelby Sloan. If you have any questions after today's visit, please call [phone].

## 2017-10-06 NOTE — PROCEDURES
[facility]   [address] [facility], [address]   [address] [facility]       Name:  Marylu Hamilton   MR#:  [id number]   :  1945   Account #:  [de-identified]    Date of Procedure:     Date of Adm:  10/02/2017       FEV1 to FVC ratio is 68 with an FEV1 of 88% with a 10% change with   bronchodilators which is not significant. All in all, patient qualifies for   mild obstructive disease. Flow volume loop is consistent with this.          Adalberto Nageotte, MD Armenta Dull / Guanako Ochoa   D:  10/06/2017   13:32   T:  10/06/2017   13:51   Job #:  [id number]

## 2017-10-13 ENCOUNTER — CLINICAL SUPPORT (OUTPATIENT)
Dept: CARDIOLOGY CLINIC | Age: 72
End: 2017-10-13

## 2017-10-13 DIAGNOSIS — R07.89 CHEST PRESSURE: Primary | ICD-10-CM

## 2017-10-13 DIAGNOSIS — R06.02 SHORTNESS OF BREATH: ICD-10-CM

## 2017-10-13 DIAGNOSIS — R07.89 CHEST PRESSURE: ICD-10-CM

## 2017-10-13 DIAGNOSIS — R06.09 DOE (DYSPNEA ON EXERTION): ICD-10-CM

## 2017-10-13 NOTE — PROGRESS NOTES
See scanned report. Dr. Ebonie Olea ordered study and Dr. Ebonie Olea read study. ID verified per protocol. Test and risks explained. Consent signed after all patient questions answered. Patient developed severe dyspnea during test. At 5 minutes in recvoery, patient without dyspnea or other complaints voiced.

## 2017-10-18 ENCOUNTER — TELEPHONE (OUTPATIENT)
Dept: CARDIOLOGY CLINIC | Age: 72
End: 2017-10-18

## 2017-10-18 NOTE — TELEPHONE ENCOUNTER
Cardiac testing  Echo 10/13/17 - EF 63%. No WMA. G1DD. Exercise Cardiolite 10/13/17 - 6:00. Normal perfusion. EF 76%    Ms. Governor Reyes was seen as a new patient by Dr. Callie Russo on 10/2/17. Referred by Dr. Karlene Chacko for evaluation of CRUZ. I have left a voicemail for her to call us back to review normal Echo and stress imaging.

## 2017-10-20 ENCOUNTER — TELEPHONE (OUTPATIENT)
Dept: CARDIOLOGY CLINIC | Age: 72
End: 2017-10-20

## 2017-10-23 ENCOUNTER — TELEPHONE (OUTPATIENT)
Dept: INTERNAL MEDICINE CLINIC | Age: 72
End: 2017-10-23

## 2017-10-23 NOTE — TELEPHONE ENCOUNTER
Please call her I was thinking about daily once a day inhaler where she takes it to help her overall breathing and fatigue- something like advair or symbicort if she was willing- she can do it once a day and see if it improves? And then she can have the albuterol as needed if she ever gets winded during exercise. . Does that work ?

## 2017-10-23 NOTE — TELEPHONE ENCOUNTER
----- Message from Aidanantonietta Neriica sent at 10/23/2017 12:56 PM EDT -----  Regarding: RE:PFT  Contact: [phone]  I don't know if the last message went through. I am willing to try anything you suggest.  Is the albuterol prescribed PRN ? I am much better, but still tire more quickly than I want to on walks. If I push through the initial shortness of breath, I do get better. Overall, I want to get back to a better routine since I have not been as active recently as in the last couple of years. I hate the hard work, but it pays off. I know losing weight would make life easier but it is just so hard to do. However,  I'm glad to know it isn't heart or any of the other things we tested. Thanks for being so thorough.  ----- Message -----  From: Alexa Harris MD  Sent: 10/23/2017 12:29 PM EDT  To: Coco López  Subject: RE:PFT    So on October 9 th I wrote about the pulmonary function tests ( the breathing test)- this is the only thing that has shown some slight abnormality with mild reactive airway disease for which an inhaler could help? I wanted to make sure the heart tests were done first and CT was normal which they are. SO let us see if an inhaler could help with the breathing? Thoughts on trying a daily medicine for this     ----- Message -----     From: Coco López     Sent: 10/23/2017 10:56 AM EDT       To: Alexa Harris MD  Subject: RE:PFT    Thanks for responding. I am coughing more-which I think is good because it seems to clear the chest more than it did before. I haven't been able to follow the messages,  I\"ve gotten emails about test results for the chest MRI, and a phone call that the heart was normal but no details,   Other messages have been surveys but no overall assessment. Could you just give me an overview of the findings or tell me where to look for them. I  would like to try the inhaler.   I am feeling much better but don't want to walk because I get out of breath. Do you have a cure for being lazy (just kidding)  Thanks so much, Cassy Johnson  ----- Message -----  From: Raul Haines MD  Sent: 10/23/2017  4:58 AM EDT  To: Ninoska Ely  Subject: RE:PFT    The hiatal hernia is really small so I really do not think that is contributing to any discomfort. The inhalers could help possibly? Do you want to try or just keep working on exercise and see how you do. The lung function changes are very mild but maybe an inhaler could make a difference in breathing? Let me know    ----- Message -----     From: Ninoska Ely     Sent: 10/22/2017  1:11 PM EDT       To: Raul Haines MD  Subject: RE:PFT    The heart tests came back normal.  I am feeling better. Any other recommendations? I guess the hiatal hernia indicates losing weight is recommended and the chest has probably healed itself, but I do get winded with exercise. I guess that is normal.   ----- Message -----  From: Raul Haines MD  Sent: 10/9/2017  9:33 PM EDT  To: Ninoska Ely  Subject: PFT    This shows very mild obstructive disease in lungs which we could use an inhaler if we wanted to  want to wait for heart eval to be done and then let me know? ?

## 2017-10-25 ENCOUNTER — TELEPHONE (OUTPATIENT)
Dept: INTERNAL MEDICINE CLINIC | Age: 72
End: 2017-10-25

## 2017-10-25 RX ORDER — FLUTICASONE PROPIONATE AND SALMETEROL 100; 50 UG/1; UG/1
1 POWDER RESPIRATORY (INHALATION) 2 TIMES DAILY
Qty: 1 INHALER | Refills: 5 | Status: SHIPPED | OUTPATIENT
Start: 2017-10-25 | End: 2017-11-29 | Stop reason: SDUPTHER

## 2017-10-25 NOTE — TELEPHONE ENCOUNTER
----- Message from Son Welch sent at 10/24/2017  5:17 PM EDT -----  Regarding: Dr Cochran/Telephone  Pt attempted to return your call @5:12 pm.  Please call her at your earliest convenience    Best contact [phone] (M)  [phone] (H)

## 2017-10-25 NOTE — TELEPHONE ENCOUNTER
Contacted pt and advised of daily inhaler. Pt also inquiring in reference to diagnosis that was the conclusion of testing. Explained the 'mild obstructive disease' shown on PFT and physician 'shortness of breath' diagnosis in chart. Pt understood and will use daily inhaler. May want to consider Advair as it is normally covered prior to Symbicort.

## 2017-11-01 DIAGNOSIS — F32.A DEPRESSION: ICD-10-CM

## 2017-11-01 DIAGNOSIS — E78.2 MIXED HYPERLIPIDEMIA: ICD-10-CM

## 2017-11-01 DIAGNOSIS — E03.9 ACQUIRED HYPOTHYROIDISM: ICD-10-CM

## 2017-11-01 DIAGNOSIS — N39.498 OTHER URINARY INCONTINENCE: ICD-10-CM

## 2017-11-02 RX ORDER — TOLTERODINE 4 MG/1
4 CAPSULE, EXTENDED RELEASE ORAL DAILY
Qty: 30 CAP | Refills: 3 | Status: SHIPPED | OUTPATIENT
Start: 2017-11-02 | End: 2018-02-10 | Stop reason: SDUPTHER

## 2017-11-02 RX ORDER — ATORVASTATIN CALCIUM 10 MG/1
10 TABLET, FILM COATED ORAL DAILY
Qty: 90 TAB | Refills: 0 | Status: SHIPPED | OUTPATIENT
Start: 2017-11-02 | End: 2018-02-10 | Stop reason: SDUPTHER

## 2017-11-02 RX ORDER — ESCITALOPRAM OXALATE 20 MG/1
20 TABLET ORAL DAILY
Qty: 90 TAB | Refills: 0 | Status: SHIPPED | OUTPATIENT
Start: 2017-11-02 | End: 2018-02-10 | Stop reason: SDUPTHER

## 2017-11-02 RX ORDER — LEVOTHYROXINE SODIUM 175 UG/1
175 TABLET ORAL
Qty: 90 TAB | Refills: 0 | Status: SHIPPED | OUTPATIENT
Start: 2017-11-02 | End: 2018-02-10 | Stop reason: SDUPTHER

## 2017-11-02 NOTE — TELEPHONE ENCOUNTER
From: Desiree Koenig  To:  Jessica Ulrich MD  Sent: 11/1/2017 10:37 AM EDT  Subject: Medication Renewal Request    Original authorizing provider: MD Desiree Brumfield would like a refill of the following medications:  escitalopram (LEXAPRO) 20 mg tablet Jessica Ulrich MD]  atorvastatin (LIPITOR) 10 mg tablet Jessica Ulrich MD]  levothyroxine (SYNTHROID) 175 mcg tablet Jessica Ulrich MD]  tolterodine ER (DETROL LA) 4 mg ER capsule Jessica Ulrich MD]    Preferred pharmacy: [address] [place], [place]:

## 2017-11-29 RX ORDER — FLUTICASONE PROPIONATE AND SALMETEROL 100; 50 UG/1; UG/1
1 POWDER RESPIRATORY (INHALATION) 2 TIMES DAILY
Qty: 3 INHALER | Refills: 1
Start: 2017-11-29 | End: 2019-05-07

## 2018-01-24 ENCOUNTER — TELEPHONE (OUTPATIENT)
Dept: INTERNAL MEDICINE CLINIC | Age: 73
End: 2018-01-24

## 2018-01-24 DIAGNOSIS — R73.02 GLUCOSE INTOLERANCE (IMPAIRED GLUCOSE TOLERANCE): ICD-10-CM

## 2018-01-24 DIAGNOSIS — E03.4 HYPOTHYROIDISM DUE TO ACQUIRED ATROPHY OF THYROID: ICD-10-CM

## 2018-01-24 DIAGNOSIS — E78.2 MIXED HYPERLIPIDEMIA: Primary | ICD-10-CM

## 2018-01-24 NOTE — TELEPHONE ENCOUNTER
----- Message from Coni Sawyer sent at 1/24/2018 11:55 AM EST -----  Regarding: Non-Urgent Medical Question  Contact: [phone]  I have an upcoming appointment on Feb. 7th but I Didn't get the forms for the lab tests last time. Could you please send them to me so I can get them done before my appt? My address is [address] [place] [address], [address] [place] [address]

## 2018-02-02 ENCOUNTER — HOSPITAL ENCOUNTER (OUTPATIENT)
Dept: MAMMOGRAPHY | Age: 73
Discharge: HOME | End: 2018-02-02
Attending: INTERNAL MEDICINE
Payer: MEDICARE

## 2018-02-02 ENCOUNTER — HOSPITAL ENCOUNTER (OUTPATIENT)
Dept: LAB | Age: 73
Discharge: HOME | End: 2018-02-02
Payer: MEDICARE

## 2018-02-02 DIAGNOSIS — Z12.39 SCREENING BREAST EXAMINATION: ICD-10-CM

## 2018-02-02 PROCEDURE — 80053 COMPREHEN METABOLIC PANEL: CPT

## 2018-02-02 PROCEDURE — 80061 LIPID PANEL: CPT

## 2018-02-02 PROCEDURE — 83036 HEMOGLOBIN GLYCOSYLATED A1C: CPT

## 2018-02-02 PROCEDURE — 84439 ASSAY OF FREE THYROXINE: CPT

## 2018-02-02 PROCEDURE — 84443 ASSAY THYROID STIM HORMONE: CPT

## 2018-02-02 PROCEDURE — 36415 COLL VENOUS BLD VENIPUNCTURE: CPT

## 2018-02-02 PROCEDURE — 77067 SCR MAMMO BI INCL CAD: CPT

## 2018-02-03 LAB
ALBUMIN SERPL-MCNC: 4.2 G/DL (ref 3.5–4.8)
ALBUMIN/GLOB SERPL: 1.9 {RATIO} (ref 1.2–2.2)
ALP SERPL-CCNC: 90 IU/L (ref 39–117)
ALT SERPL-CCNC: 21 IU/L (ref 0–32)
AST SERPL-CCNC: 23 IU/L (ref 0–40)
BILIRUB SERPL-MCNC: 0.8 MG/DL (ref 0–1.2)
BUN SERPL-MCNC: 10 MG/DL (ref 8–27)
BUN/CREAT SERPL: 13 (ref 12–28)
CALCIUM SERPL-MCNC: 9.5 MG/DL (ref 8.7–10.3)
CHLORIDE SERPL-SCNC: 101 MMOL/L (ref 96–106)
CHOLEST SERPL-MCNC: 195 MG/DL (ref 100–199)
CO2 SERPL-SCNC: 25 MMOL/L (ref 18–29)
CREAT SERPL-MCNC: 0.78 MG/DL (ref 0.57–1)
EST. AVERAGE GLUCOSE BLD GHB EST-MCNC: 117 MG/DL
GFR SERPLBLD CREATININE-BSD FMLA CKD-EPI: 77 ML/MIN/1.73
GFR SERPLBLD CREATININE-BSD FMLA CKD-EPI: 89 ML/MIN/1.73
GLOBULIN SER CALC-MCNC: 2.2 G/DL (ref 1.5–4.5)
GLUCOSE SERPL-MCNC: 108 MG/DL (ref 65–99)
HBA1C MFR BLD: 5.7 % (ref 4.8–5.6)
HDLC SERPL-MCNC: 65 MG/DL
INTERPRETATION, 910389: NORMAL
LDLC SERPL CALC-MCNC: 113 MG/DL (ref 0–99)
POTASSIUM SERPL-SCNC: 4.6 MMOL/L (ref 3.5–5.2)
PROT SERPL-MCNC: 6.4 G/DL (ref 6–8.5)
SODIUM SERPL-SCNC: 140 MMOL/L (ref 134–144)
T4 FREE SERPL-MCNC: 1.15 NG/DL (ref 0.82–1.77)
TRIGL SERPL-MCNC: 87 MG/DL (ref 0–149)
TSH SERPL DL<=0.005 MIU/L-ACNC: 3.02 UIU/ML (ref 0.45–4.5)
VLDLC SERPL CALC-MCNC: 17 MG/DL (ref 5–40)

## 2018-02-08 ENCOUNTER — OFFICE VISIT (OUTPATIENT)
Dept: INTERNAL MEDICINE CLINIC | Age: 73
End: 2018-02-08

## 2018-02-08 VITALS
TEMPERATURE: 98.1 F | HEIGHT: 63 IN | HEART RATE: 72 BPM | WEIGHT: 220.6 LBS | SYSTOLIC BLOOD PRESSURE: 139 MMHG | BODY MASS INDEX: 39.09 KG/M2 | OXYGEN SATURATION: 98 % | DIASTOLIC BLOOD PRESSURE: 66 MMHG | RESPIRATION RATE: 14 BRPM

## 2018-02-08 DIAGNOSIS — E03.4 HYPOTHYROIDISM DUE TO ACQUIRED ATROPHY OF THYROID: ICD-10-CM

## 2018-02-08 DIAGNOSIS — F34.1 DYSTHYMIA: ICD-10-CM

## 2018-02-08 DIAGNOSIS — Z00.00 MEDICARE ANNUAL WELLNESS VISIT, SUBSEQUENT: Primary | ICD-10-CM

## 2018-02-08 DIAGNOSIS — R73.02 GLUCOSE INTOLERANCE (IMPAIRED GLUCOSE TOLERANCE): ICD-10-CM

## 2018-02-08 DIAGNOSIS — R06.09 DOE (DYSPNEA ON EXERTION): ICD-10-CM

## 2018-02-08 DIAGNOSIS — E78.2 MIXED HYPERLIPIDEMIA: ICD-10-CM

## 2018-02-08 RX ORDER — FLUTICASONE PROPIONATE AND SALMETEROL 250; 50 UG/1; UG/1
1 POWDER RESPIRATORY (INHALATION) 2 TIMES DAILY
Qty: 1 INHALER | Refills: 3 | Status: SHIPPED | OUTPATIENT
Start: 2018-02-08 | End: 2018-04-02 | Stop reason: SDUPTHER

## 2018-02-08 NOTE — PATIENT INSTRUCTIONS

## 2018-02-08 NOTE — PROGRESS NOTES
HISTORY OF PRESENT ILLNESS  Taye Medina is a 81 y.o. female. HPI   She is going SW in the fall- doing a lot of art shows - [employer]     Once in awhile when she swallows and feels like it gets down part way and feels like throwing up;     SUBJECTIVE: Taye Meidna is a 81 y.o. female here for follow up of hypothyroidism. Lab Results   Component Value Date/Time    TSH 3.020 02/02/2018 10:19 AM     Thyroid ROS: denies fatigue, weight changes, heat/cold intolerance, bowel/skin changes or CVS symptoms. Dysthymia- cont with lexapro as doing and feeling well on it  She does feel arthritis sometimes in her hands and ankles and she take aleve every day in morning and sometimes at night   Subjective: Taye Medina is a 81 y.o. female with hyperlipidemia. Cardiovascular risk analysis - 81 y.o. female LDL goal is under 130. ROS: taking medications as instructed, no medication side effects noted, no TIA's, no chest pain on exertion, no dyspnea on exertion, no swelling of ankles. Tolerating meds, no myalgias or other side effects noted  New concerns: she did have a cardiac work up last fall with normal stress test.     Sob- there does seem to be some mild obstructive disease on PFT so we have tried some advair but she does not feel like that has been helping as much and seems to be having a hard time moving around    She has really conquered her diet and is eating right and eating for health - good recepies- eating veggies with  book     Review of Systems   Constitutional: Negative for chills, diaphoresis, fever, malaise/fatigue and weight loss. HENT: Negative for congestion, ear pain, hearing loss, sore throat and tinnitus. Eyes: Negative for blurred vision and double vision. Respiratory: Negative for cough, hemoptysis, sputum production, shortness of breath and wheezing. Cardiovascular: Negative for chest pain, palpitations, orthopnea, claudication, leg swelling and PND. Gastrointestinal: Negative for abdominal pain, blood in stool, constipation, diarrhea, heartburn, nausea and vomiting. Genitourinary: Negative for dysuria, flank pain, frequency, hematuria and urgency. Musculoskeletal: Negative for back pain, joint pain, myalgias and neck pain. Skin: Negative. Neurological: Negative for dizziness, tingling, sensory change, speech change, focal weakness, seizures, loss of consciousness, weakness and headaches. Endo/Heme/Allergies: Negative for environmental allergies and polydipsia. Does not bruise/bleed easily. Psychiatric/Behavioral: Negative for depression, memory loss, substance abuse and suicidal ideas. The patient is not nervous/anxious and does not have insomnia. Physical Exam   Constitutional: She is oriented to person, place, and time. She appears well-developed and well-nourished. HENT:   Head: Normocephalic and atraumatic. Right Ear: External ear normal.   Left Ear: External ear normal.   Nose: Nose normal.   Mouth/Throat: Oropharynx is clear and moist.   Eyes: Conjunctivae and EOM are normal. Pupils are equal, round, and reactive to light. Neck: Normal range of motion. Neck supple. Carotid bruit is not present. No thyromegaly present. Cardiovascular: Normal rate, regular rhythm, S1 normal, S2 normal, normal heart sounds and intact distal pulses. Pulmonary/Chest: Effort normal and breath sounds normal.   Abdominal: Soft. Normal appearance and bowel sounds are normal. There is no hepatosplenomegaly. There is no tenderness. Musculoskeletal: Normal range of motion. Neurological: She is alert and oriented to person, place, and time. Skin: Skin is warm, dry and intact. Psychiatric: She has a normal mood and affect. Her behavior is normal. Judgment and thought content normal.   Nursing note and vitals reviewed. ASSESSMENT and PLAN  Diagnoses and all orders for this visit:    1. Medicare annual wellness visit, subsequent    2.  Mixed hyperlipidemia- cont current dose of medicine stable    3. Dysthymia-stable at current dose    4. Hypothyroidism due to acquired atrophy of thyroid-stable on current dose of medicine    5. CRUZ (dyspnea on exertion)- we are going to refer to pulmonary as she is still feeling sob and not better from it- we are also going to try an increased dose of advair      lab results and schedule of future lab studies reviewed with patient  reviewed diet, exercise and weight control  cardiovascular risk and specific lipid/LDL goals reviewed  reviewed medications and side effects in detail    This is a Subsequent Medicare Annual Wellness Exam (AWV) (Performed 12 months after IPPE or effective date of Medicare Part B enrollment)    I have reviewed the patient's medical history in detail and updated the computerized patient record. History     Past Medical History:   Diagnosis Date    Cancer ([facility] [address]     thyroid    Depression     Hyperlipidemia     Unspecified hypothyroidism 2009    thyroid cancer      Past Surgical History:   Procedure Laterality Date    HX  SECTION      HX CYST INCISION AND DRAINAGE Left     neg    HX HEENT      thyroid    THYROIDECTOMY       Current Outpatient Prescriptions   Medication Sig Dispense Refill    fluticasone-salmeterol (ADVAIR) 100-50 mcg/dose diskus inhaler Take 1 Puff by inhalation two (2) times a day. 3 Inhaler 1    naproxen sodium (ALEVE) 220 mg tablet Take 220 mg by mouth two (2) times daily (with meals).  tolterodine ER (DETROL LA) 4 mg ER capsule Take 1 capsule by mouth daily for 30 days. 30 capsule 3    escitalopram oxalate (LEXAPRO) 20 mg tablet Take 1 Tab by mouth daily for 90 days. 90 Tab 0    atorvastatin (LIPITOR) 10 mg tablet Take 1 Tab by mouth daily for 90 days. 90 Tab 0    levothyroxine (SYNTHROID) 175 mcg tablet Take 1 Tab by mouth Daily (before breakfast) for 90 days.  90 Tab 0    tolterodine ER (DETROL LA) 4 mg ER capsule Take 1 Cap by mouth daily for 30 days. 30 Cap 3    albuterol (VENTOLIN HFA) 90 mcg/actuation inhaler Take  by inhalation. Allergies   Allergen Reactions    Zetia [Ezetimibe] Rash     Family History   Problem Relation Age of Onset    Cancer Mother     Arthritis-osteo Mother     Breast Cancer Mother [de-identified]    Cancer Sister     Thyroid Disease Sister     Cancer Brother     Heart Failure Maternal Grandmother     Heart Failure Maternal Grandfather     Heart Failure Father      Social History   Substance Use Topics    Smoking status: Never Smoker    Smokeless tobacco: Never Used    Alcohol use 4.2 oz/week     7 Glasses of wine per week     Patient Active Problem List   Diagnosis Code    Depression F32.9    Hypothyroidism E03.9    Hyperlipidemia E78.5    ACP (advance care planning) Z71.89    Shortness of breath R06.02    Dyslipidemia E78.5    Chest pressure R07.89    CRUZ (dyspnea on exertion) R06.09       Depression Risk Factor Screening:     PHQ over the last two weeks 9/28/2015   Little interest or pleasure in doing things Not at all   Feeling down, depressed or hopeless Not at all   Total Score PHQ 2 0     Alcohol Risk Factor Screening: You do not drink alcohol or very rarely. Functional Ability and Level of Safety:   Hearing Loss  Hearing is good. Activities of Daily Living  The home contains: no safety equipment. Patient does total self care    Fall Risk  Fall Risk Assessment, last 12 mths 4/11/2017   Able to walk? Yes   Fall in past 12 months?  No   Fall with injury? -   Number of falls in past 12 months -   Fall Risk Score -       Abuse Screen  Patient is not abused    Cognitive Screening   Evaluation of Cognitive Function:  Has your family/caregiver stated any concerns about your memory: no  Normal    Patient Care Team   Patient Care Team:  Autumn Camacho MD as PCP - General    Assessment/Plan   Education and counseling provided:  Are appropriate based on today's review and evaluation  End-of-Life planning (with patient's consent)    Diagnoses and all orders for this visit:    1. Medicare annual wellness visit, subsequent    2. Mixed hyperlipidemia    3. Dysthymia    4.  Hypothyroidism due to acquired atrophy of thyroid        Health Maintenance Due   Topic Date Due    Pneumococcal 65+ Low/Medium Risk (2 of 2 - PPSV23) 03/04/2016    Influenza Age 5 to Adult  08/01/2017    MEDICARE YEARLY EXAM  02/08/2018

## 2018-03-13 ENCOUNTER — HOSPITAL ENCOUNTER (OUTPATIENT)
Dept: GENERAL RADIOLOGY | Age: 73
Discharge: HOME | End: 2018-03-13
Payer: MEDICARE

## 2018-03-13 DIAGNOSIS — R06.02 SHORTNESS OF BREATH: ICD-10-CM

## 2018-03-13 PROCEDURE — 71046 X-RAY EXAM CHEST 2 VIEWS: CPT

## 2018-04-02 RX ORDER — FLUTICASONE PROPIONATE AND SALMETEROL 250; 50 UG/1; UG/1
1 POWDER RESPIRATORY (INHALATION) 2 TIMES DAILY
Qty: 3 INHALER | Refills: 1 | Status: SHIPPED | OUTPATIENT
Start: 2018-04-02 | End: 2021-03-23

## 2018-06-26 ENCOUNTER — OFFICE VISIT (OUTPATIENT)
Dept: INTERNAL MEDICINE CLINIC | Age: 73
End: 2018-06-26

## 2018-06-26 VITALS
OXYGEN SATURATION: 98 % | TEMPERATURE: 98.6 F | WEIGHT: 220.6 LBS | HEIGHT: 63 IN | RESPIRATION RATE: 14 BRPM | HEART RATE: 69 BPM | DIASTOLIC BLOOD PRESSURE: 64 MMHG | BODY MASS INDEX: 39.09 KG/M2 | SYSTOLIC BLOOD PRESSURE: 123 MMHG

## 2018-06-26 DIAGNOSIS — E03.4 HYPOTHYROIDISM DUE TO ACQUIRED ATROPHY OF THYROID: ICD-10-CM

## 2018-06-26 DIAGNOSIS — R07.0 THROAT DISCOMFORT: Primary | ICD-10-CM

## 2018-06-26 PROBLEM — E66.01 SEVERE OBESITY (BMI 35.0-39.9): Status: ACTIVE | Noted: 2018-06-26

## 2018-06-26 NOTE — PROGRESS NOTES
HISTORY OF PRESENT ILLNESS  Nabil Guerrero is a 81 y.o. female. HPI  Throat Discomfort: Pt reports worsening discomfort at base of throat. She notes that discomfort \"feels like swallowing a stick. \" She notes acidic taste that comes up throat, especially when on empty stomach. She also experiences vomiting. Pt notes that she did undergo thyroid removal in past.    hypothyroidism. Lab Results   Component Value Date/Time    TSH 3.020 02/02/2018 10:19 AM     Thyroid ROS: denies fatigue, weight changes, heat/cold intolerance, bowel/skin changes or CVS symptoms. Pt continues on Synthroid. Weight: Pt is considering Medifast diet to lose weight. Review of Systems   All other systems reviewed and are negative. Physical Exam   Constitutional: She is oriented to person, place, and time. She appears well-developed and well-nourished. HENT:   Head: Normocephalic and atraumatic. Right Ear: External ear normal.   Left Ear: External ear normal.   Nose: Nose normal.   Mouth/Throat: Oropharynx is clear and moist.   Eyes: Conjunctivae and EOM are normal.   Neck: Normal range of motion. Neck supple. Carotid bruit is not present. No thyroid mass and no thyromegaly present. Cardiovascular: Normal rate, regular rhythm, S1 normal, S2 normal, normal heart sounds and intact distal pulses. Pulmonary/Chest: Effort normal and breath sounds normal.   Abdominal: Soft. Normal appearance and bowel sounds are normal. There is no hepatosplenomegaly. There is no tenderness. Musculoskeletal: Normal range of motion. Neurological: She is alert and oriented to person, place, and time. She has normal strength. No cranial nerve deficit or sensory deficit. Coordination normal.   Skin: Skin is warm, dry and intact. No abrasion and no rash noted. Psychiatric: She has a normal mood and affect. Her behavior is normal. Judgment and thought content normal.   Nursing note and vitals reviewed.       ASSESSMENT and PLAN  Diagnoses and all orders for this visit:    1. Throat discomfort  Pt will follow up with GI for endoscopy. Will monitor for results. I am concerned for possible diverticulum or stricture that could be causing the issues with the swallowing and potential issues with discomfort could be some narrowing there and then the food has to come back as it cannot get through     2. Hypothyroidism due to acquired atrophy of thyroid  Thyroid stable. I do not recommend a change in medications. Cont current dose of medicine ; she does have a h/o papillary thyroid cancer but do not think this is the cause of her symptoms as she does not have thyroid enlargement but we will first address swallowing and go from there     This note will not be viewable in [facility]. Over 50% of the 25 minutes face to face with Yane Jauregui consisted of counseling and/or discussing treatment plans in reference to her etiology of her throat discomfort, going over her records of her thyroid cancer and possible causes . Lab results and schedule of future lab studies reviewed with patient. Reviewed diet, exercise and weight control. Written by Todd Lackey, as dictated by Deedee Richardson MD.     Current diagnosis and concerns discussed with pt at length. Understands risks and benefits or current treatment plan and medications and accepts the treatment and medication with any possible risks. Pt asks appropriate questions which were answered. Pt instructed to call with any concerns or problems.

## 2018-06-27 ENCOUNTER — DOCUMENTATION ONLY (OUTPATIENT)
Dept: INTERNAL MEDICINE CLINIC | Age: 73
End: 2018-06-27

## 2018-08-01 NOTE — H&P
[address]  [address] [facility], [address] [facility]  [phone]                     History and Physical     NAME: Wero Hutchisno   :  1945   MRN:  [id number]    81year old female who presents with a complaint of difficulty swallowing. The onset of the difficulty swallowing has been gradual and has been occurring in a persistent pattern for 6 months. The course has been constant. The difficulty swallowing is described as being located in the neck and throat. The symptoms are aggravated by solids only and  are relieved by  taking liquids. The symptoms have been associated with vomiting, while the symptoms have not been associated with abdominal pain, chest pain, heartburn, hoarseness following dysphagia, nasal regurgitation or weight loss. Previous surgery : other (Thyroidectomy). HPI:       Past Surgical History:   Procedure Laterality Date    HX  SECTION      HX CYST INCISION AND DRAINAGE Left     neg    HX HEENT      thyroid    THYROIDECTOMY       Past Medical History:   Diagnosis Date    [facility]     thyroid    Depression     Hyperlipidemia     Unspecified hypothyroidism 2009    thyroid cancer     Social History   Substance Use Topics    Smoking status: Never Smoker    Smokeless tobacco: Never Used    Alcohol use 4.2 oz/week     7 Glasses of wine per week     Allergies   Allergen Reactions    Zetia [Ezetimibe] Rash     Family History   Problem Relation Age of Onset    Cancer Mother     Arthritis-osteo Mother     Breast Cancer Mother [de-identified]    Cancer Sister     Thyroid Disease Sister     Cancer Brother     Heart Failure Maternal Grandmother     Heart Failure Maternal Grandfather     Heart Failure Father      No current facility-administered medications for this encounter.       Current Outpatient Prescriptions   Medication Sig    levothyroxine (SYNTHROID) 175 mcg tablet TAKE ONE TABLET BY MOUTH DAILY BEFORE BREAKFAST    fluticasone-salmeterol (ADVAIR) 250-50 mcg/dose diskus inhaler Take 1 Puff by inhalation two (2) times a day for 90 days.  escitalopram oxalate (LEXAPRO) 20 mg tablet Take 1 Tab by mouth daily for 90 days.  atorvastatin (LIPITOR) 10 mg tablet Take 1 Tab by mouth daily for 90 days.  tolterodine ER (DETROL LA) 4 mg ER capsule Take 1 Cap by mouth daily for 90 days.  fluticasone-salmeterol (ADVAIR) 100-50 mcg/dose diskus inhaler Take 1 Puff by inhalation two (2) times a day.  albuterol (VENTOLIN HFA) 90 mcg/actuation inhaler Take  by inhalation.  naproxen sodium (ALEVE) 220 mg tablet Take 220 mg by mouth two (2) times daily (with meals).  tolterodine ER (DETROL LA) 4 mg ER capsule Take 1 capsule by mouth daily for 30 days. PHYSICAL EXAM:  General: WD, WN. Alert, cooperative, no acute distress    HEENT: NC, Atraumatic. PERRLA, EOMI. Anicteric sclerae. Lungs:  CTA Bilaterally. No Wheezing/Rhonchi/Rales. Heart:  Regular  rhythm,  No murmur, No Rubs, No Gallops  Abdomen: Soft, Non distended, Non tender.  +Bowel sounds, no HSM  Extremities: No c/c/e  Neurologic:  CN 2-12 gi, Alert and oriented X 3. No acute neurological distress   Psych:   Good insight. Not anxious nor agitated. Assessment:   I have reviewed with the patient +/- family alternatives,benefits and risks for the procedure, as well as potential complications(with emphasis on, but not limited to, bleeding, perforation, cardiovascular/cerebrovascular/pulmonary events, reactions to the medications, infection, risk of missing a lesions/a cancer, and the imponderables including death), alternative options, and patient/family voices understanding.       Plan:   · Endoscopic procedure  · Conscious sedation or MAC

## 2018-08-02 ENCOUNTER — ANESTHESIA (OUTPATIENT)
Dept: ENDOSCOPY | Age: 73
End: 2018-08-02
Payer: MEDICARE

## 2018-08-02 ENCOUNTER — HOSPITAL ENCOUNTER (OUTPATIENT)
Age: 73
Setting detail: OUTPATIENT SURGERY
Discharge: HOME | End: 2018-08-02
Attending: INTERNAL MEDICINE | Admitting: INTERNAL MEDICINE
Payer: MEDICARE

## 2018-08-02 ENCOUNTER — ANESTHESIA EVENT (OUTPATIENT)
Dept: ENDOSCOPY | Age: 73
End: 2018-08-02
Payer: MEDICARE

## 2018-08-02 VITALS
HEIGHT: 65 IN | TEMPERATURE: 98.1 F | RESPIRATION RATE: 20 BRPM | SYSTOLIC BLOOD PRESSURE: 106 MMHG | BODY MASS INDEX: 35.99 KG/M2 | HEART RATE: 59 BPM | OXYGEN SATURATION: 96 % | WEIGHT: 216 LBS | DIASTOLIC BLOOD PRESSURE: 55 MMHG

## 2018-08-02 LAB
H PYLORI FROM TISSUE: NEGATIVE
KIT LOT NO., HCLOLOT: NORMAL
NEGATIVE CONTROL: NEGATIVE
POSITIVE CONTROL: POSITIVE

## 2018-08-02 PROCEDURE — 87077 CULTURE AEROBIC IDENTIFY: CPT | Performed by: INTERNAL MEDICINE

## 2018-08-02 PROCEDURE — 76040000019: Performed by: INTERNAL MEDICINE

## 2018-08-02 PROCEDURE — 74011250636 HC RX REV CODE- 250/636: Performed by: INTERNAL MEDICINE

## 2018-08-02 PROCEDURE — 74011250636 HC RX REV CODE- 250/636

## 2018-08-02 PROCEDURE — 88305 TISSUE EXAM BY PATHOLOGIST: CPT | Performed by: INTERNAL MEDICINE

## 2018-08-02 PROCEDURE — 77030009426 HC FCPS BIOP ENDOSC BSC -B: Performed by: INTERNAL MEDICINE

## 2018-08-02 PROCEDURE — 76060000031 HC ANESTHESIA FIRST 0.5 HR: Performed by: INTERNAL MEDICINE

## 2018-08-02 RX ORDER — SODIUM CHLORIDE 9 MG/ML
50 INJECTION, SOLUTION INTRAVENOUS CONTINUOUS
Status: DISCONTINUED | OUTPATIENT
Start: 2018-08-02 | End: 2018-08-02 | Stop reason: HOSPADM

## 2018-08-02 RX ORDER — DEXTROMETHORPHAN/PSEUDOEPHED 2.5-7.5/.8
1.2 DROPS ORAL
Status: DISCONTINUED | OUTPATIENT
Start: 2018-08-02 | End: 2018-08-02 | Stop reason: HOSPADM

## 2018-08-02 RX ORDER — FLUMAZENIL 0.1 MG/ML
0.2 INJECTION INTRAVENOUS
Status: DISCONTINUED | OUTPATIENT
Start: 2018-08-02 | End: 2018-08-02 | Stop reason: HOSPADM

## 2018-08-02 RX ORDER — PROPOFOL 10 MG/ML
INJECTION, EMULSION INTRAVENOUS
Status: DISCONTINUED | OUTPATIENT
Start: 2018-08-02 | End: 2018-08-02 | Stop reason: HOSPADM

## 2018-08-02 RX ORDER — NALOXONE HYDROCHLORIDE 0.4 MG/ML
0.4 INJECTION, SOLUTION INTRAMUSCULAR; INTRAVENOUS; SUBCUTANEOUS
Status: DISCONTINUED | OUTPATIENT
Start: 2018-08-02 | End: 2018-08-02 | Stop reason: HOSPADM

## 2018-08-02 RX ORDER — EPINEPHRINE 0.1 MG/ML
1 INJECTION INTRACARDIAC; INTRAVENOUS
Status: DISCONTINUED | OUTPATIENT
Start: 2018-08-02 | End: 2018-08-02 | Stop reason: HOSPADM

## 2018-08-02 RX ORDER — LIDOCAINE HYDROCHLORIDE 20 MG/ML
INJECTION, SOLUTION EPIDURAL; INFILTRATION; INTRACAUDAL; PERINEURAL AS NEEDED
Status: DISCONTINUED | OUTPATIENT
Start: 2018-08-02 | End: 2018-08-02 | Stop reason: HOSPADM

## 2018-08-02 RX ORDER — ATROPINE SULFATE 0.1 MG/ML
0.5 INJECTION INTRAVENOUS
Status: DISCONTINUED | OUTPATIENT
Start: 2018-08-02 | End: 2018-08-02 | Stop reason: HOSPADM

## 2018-08-02 RX ORDER — PROPOFOL 10 MG/ML
INJECTION, EMULSION INTRAVENOUS AS NEEDED
Status: DISCONTINUED | OUTPATIENT
Start: 2018-08-02 | End: 2018-08-02 | Stop reason: HOSPADM

## 2018-08-02 RX ORDER — MIDAZOLAM HYDROCHLORIDE 1 MG/ML
.25-5 INJECTION, SOLUTION INTRAMUSCULAR; INTRAVENOUS
Status: DISCONTINUED | OUTPATIENT
Start: 2018-08-02 | End: 2018-08-02 | Stop reason: HOSPADM

## 2018-08-02 RX ADMIN — PROPOFOL 60 MG: 10 INJECTION, EMULSION INTRAVENOUS at 09:11

## 2018-08-02 RX ADMIN — LIDOCAINE HYDROCHLORIDE 40 MG: 20 INJECTION, SOLUTION EPIDURAL; INFILTRATION; INTRACAUDAL; PERINEURAL at 09:11

## 2018-08-02 RX ADMIN — SODIUM CHLORIDE 50 ML/HR: 900 INJECTION, SOLUTION INTRAVENOUS at 08:40

## 2018-08-02 RX ADMIN — PROPOFOL 120 MCG/KG/MIN: 10 INJECTION, EMULSION INTRAVENOUS at 09:11

## 2018-08-02 NOTE — PROCEDURES
[address]  [address] [facility], [address] [facility]       [phone]                   2018                EGD Operative Report  Rina Alan  :  1945  [facility] Medical Record Number:  [id number]      Indication:  Dysphagia/odynophagia     : Nahun Thomas MD    Referring Provider:  Marshall Lora MD      Anesthesia/Sedation:  MAC anesthesia Propofol    Airway assessment: No airway problems anticipated    Pre-Procedural Exam:      Airway: clear, no airway problems anticipated  Heart: RRR, without gallops or rubs  Lungs: clear bilaterally without wheezes, crackles, or rhonchi  Abdomen: soft, nontender, nondistended, bowel sounds present  Mental Status: awake, alert and oriented to person, place and time       Procedure Details     After infomed consent was obtained for the procedure, with all risks and benefits of procedure explained the patient was taken to the endoscopy suite and placed in the left lateral decubitus position. Following sequential administration of sedation as per above, the endoscope was inserted into the mouth and advanced under direct vision to second portion of the duodenum. A careful inspection was made as the gastroscope was withdrawn, including a retroflexed view of the proximal stomach; findings and interventions are described below. Findings:   Esophagus: LA grade A esophagitis at GEJ. BX taken. Rest of the esophagus is normal. Bx taken in proximal esophagus as well. Stomach: normal mucosa. Small sliding hiatal hernia. Duodenum/jejunum: normal    Therapies:    biopsy of esophagus  biopsy of stomach body, antrum    Specimens: as above           Complications:   None; patient tolerated the procedure well. EBL:  None. Impression:      Small Hiatal Hernia  Esophagitis  LA Grade A        Recommendations:    -Acid suppression with a proton pump inhibitor. ,   -Await pathology. ,   -Await OSCAR test result and treat for Helicobacter pylori if positive. ,   -GERD diet: avoid fried and fatty foods.  peppermint, chocolate, alcohol, coffee, citrus fruits and juices, tomoato products; avoid lying down for 2 to 3 hours after eating.,   -No NSAIDS,   -Follow up in office as previously scheduled      Maria Guadalupe Krause MD

## 2018-08-02 NOTE — PERIOP NOTES
Rina Alan  1945  [id number]    Situation:  Verbal report received from: Kannan Becerra RN  Procedure: Procedure(s):  ESOPHAGOGASTRODUODENOSCOPY (EGD)  ESOPHAGOGASTRODUODENAL (EGD) BIOPSY    Background:    Preoperative diagnosis: DYSPHAGIA  Postoperative diagnosis: Small Hiatal Hernia  Esophagitis      :  Dr. Cristin Cheema  Assistant(s): Endoscopy RN-1: Sidra Carmona RN; Esequiel Gill RN    Specimens:   ID Type Source Tests Collected by Time Destination   1 : [address] Cristin Cheema MD 8/2/2018 4440 Pathology   2 : Proximal Esophagus Biopsies Preservative   Nahun Thomas MD 8/2/2018 4385 Pathology     H. Pylori  yes    Assessment:  Intra-procedure medications     Propofol    Anesthesia gave intra-procedure sedation and medications, see anesthesia flow sheet yes    Intravenous fluids: NS@ KVO     Vital signs stable     Abdominal assessment: round and soft     Recommendation:  Discharge patient per MD order.     Family or Friend yes

## 2018-08-02 NOTE — ANESTHESIA PREPROCEDURE EVALUATION
Anesthetic History   No history of anesthetic complications            Review of Systems / Medical History  Patient summary reviewed, nursing notes reviewed and pertinent labs reviewed    Pulmonary    COPD: mild               Neuro/Psych         Psychiatric history     Cardiovascular              Hyperlipidemia    Exercise tolerance: >4 METS     GI/Hepatic/Renal     GERD           Endo/Other      Hypothyroidism: well controlled  Arthritis and cancer (h/o thyroid cancer)     Other Findings              Physical Exam    Airway  Mallampati: II  TM Distance: 4 - 6 cm  Neck ROM: normal range of motion   Mouth opening: Normal     Cardiovascular  Regular rate and rhythm,  S1 and S2 normal,  no murmur, click, rub, or gallop  Rhythm: regular  Rate: normal         Dental    Dentition: Implants     Pulmonary  Breath sounds clear to auscultation               Abdominal  GI exam deferred       Other Findings            Anesthetic Plan    ASA: 2  Anesthesia type: MAC          Induction: Intravenous  Anesthetic plan and risks discussed with: Patient

## 2018-08-02 NOTE — DISCHARGE INSTRUCTIONS
[address] [facility]  [address], [address] [facility]  [phone]               EGD DISCHARGE INSTRUCTIONS    Kamran Blackburn  [id number]  1945    DISCOMFORT:  Sore throat- throat lozenges or warm salt water gargle  redness at IV site- apply warm compress to area; if redness or soreness persist- contact your physician  Gaseous discomfort- walking, belching will help relieve any discomfort  You may not operate a vehicle for 12 hours  You may not engage in an occupation involving machinery or appliances for rest of today  You may not drink alcoholic beverages for at least 12 hours  Avoid making any critical decisions for at least 24 hour  DIET  You may eat and drink now and after you leave. You may resume your regular diet - however -  remember your colon is empty and a heavy meal will produce gas. Avoid these foods:  vegetables, fried / greasy foods, carbonated drinks    ACTIVITY  You may resume your normal daily activities   Spend the remainder of the day resting -  avoid any strenuous activity. CALL M.D. ANY SIGN OF   Increasing pain, nausea, vomiting  Abdominal distension (swelling)  New increased bleeding (oral or rectal)  Fever (chills)  Pain in chest area  Bloody discharge from nose or mouth  Shortness of breath    Follow-up Instructions:   Call Dr. Lanetta Rinne for any questions or problems. Telephone # [phone]    ENDOSCOPY FINDINGS:   Your endoscopy showed grade A esophagitis and small hiatal hernia.           Signed By: Nazia Lynch MD     8/2/2018  9:33 AM

## 2018-08-02 NOTE — ANESTHESIA POSTPROCEDURE EVALUATION
Post-Anesthesia Evaluation and Assessment    Patient: Rodolfo Bravo MRN: [id number]  SSN: [id number]    YOB: 1945  Age: 81 y.o. Sex: female       Cardiovascular Function/Vital Signs  Visit Vitals    /55    Pulse (!) 59    Temp 36.7 °C (98.1 °F)    Resp 20    Ht 5' 5\" (1.651 m)    Wt 98 kg (216 lb)    SpO2 96%    Breastfeeding No    BMI 35.94 kg/m2       Patient is status post MAC anesthesia for Procedure(s):  ESOPHAGOGASTRODUODENOSCOPY (EGD)  ESOPHAGOGASTRODUODENAL (EGD) BIOPSY. Nausea/Vomiting: None    Postoperative hydration reviewed and adequate. Pain:  Pain Scale 1: Numeric (0 - 10) (08/02/18 0946)  Pain Intensity 1: 0 (08/02/18 0946)   Managed    Neurological Status: At baseline    Mental Status and Level of Consciousness: Arousable    Pulmonary Status:   O2 Device: Room air (08/02/18 0946)   Adequate oxygenation and airway patent    Complications related to anesthesia: None    Post-anesthesia assessment completed.  No concerns    Signed By: Marlene Powell MD     August 2, 2018

## 2018-08-02 NOTE — IP AVS SNAPSHOT
[address] [facility] 
 
 
 [address] [facility] 
[phone] Patient: Darci Morales MRN: [id number] :1945 About your hospitalization You were admitted on:  2018 You last received care in the:  [facility] ENDOSCOPY You were discharged on:  2018 Why you were hospitalized Your primary diagnosis was:  Not on File Follow-up Information None Your Scheduled Appointments Friday August 10, 2018  9:15 AM EDT ROUTINE CARE with Mera Yeung MD  
[facility] [address] [place] [address] [facility]  
[phone] Discharge Orders None A check americo indicates which time of day the medication should be taken. My Medications CONTINUE taking these medications Instructions Each Dose to Equal  
 Morning Noon Evening Bedtime ALEVE 220 mg tablet Generic drug:  naproxen sodium Your last dose was: Your next dose is: Take 220 mg by mouth two (2) times daily (with meals). 220 mg  
    
   
   
   
  
 atorvastatin 10 mg tablet Commonly known as:  LIPITOR Your last dose was: Your next dose is: Take 1 Tab by mouth daily for 90 days. 10 mg  
    
   
   
   
  
 escitalopram oxalate 20 mg tablet Commonly known as:  Lynne William Your last dose was: Your next dose is: Take 1 Tab by mouth daily for 90 days. 20 mg  
    
   
   
   
  
 * fluticasone-salmeterol 100-50 mcg/dose diskus inhaler Commonly known as:  ADVAIR Your last dose was: Your next dose is: Take 1 Puff by inhalation two (2) times a day. 1 Puff * fluticasone-salmeterol 250-50 mcg/dose diskus inhaler Commonly known as:  ADVAIR Your last dose was: Your next dose is: Take 1 Puff by inhalation two (2) times a day for 90 days. 1 Puff  
    
   
   
   
  
 levothyroxine 175 mcg tablet Commonly known as:  SYNTHROID Your last dose was: Your next dose is: TAKE ONE TABLET BY MOUTH DAILY BEFORE BREAKFAST * tolterodine ER 4 mg ER capsule Commonly known as:  Mirza Nancer Your last dose was: Your next dose is: Take 1 capsule by mouth daily for 30 days. 4 mg * tolterodine ER 4 mg ER capsule Commonly known as:  Mirza Nancer Your last dose was: Your next dose is: Take 1 Cap by mouth daily for 90 days. 4 mg VENTOLIN HFA 90 mcg/actuation inhaler Generic drug:  albuterol Your last dose was: Your next dose is: Take  by inhalation. * Notice: This list has 4 medication(s) that are the same as other medications prescribed for you. Read the directions carefully, and ask your doctor or other care provider to review them with you. Discharge Instructions [address] [facility] [address] 
[phone] EGD DISCHARGE INSTRUCTIONS Surjit Rosas [id number] 
1945 DISCOMFORT: 
Sore throat- throat lozenges or warm salt water gargle 
redness at IV site- apply warm compress to area; if redness or soreness persist- contact your physician Gaseous discomfort- walking, belching will help relieve any discomfort You may not operate a vehicle for 12 hours You may not engage in an occupation involving machinery or appliances for rest of today You may not drink alcoholic beverages for at least 12 hours Avoid making any critical decisions for at least 24 hour DIET You may eat and drink now and after you leave.  
You may resume your regular diet  however -  remember your colon is empty and a heavy meal will produce gas. Avoid these foods:  vegetables, fried / greasy foods, carbonated drinks ACTIVITY You may resume your normal daily activities Spend the remainder of the day resting -  avoid any strenuous activity. CALL M.D. ANY SIGN OF Increasing pain, nausea, vomiting Abdominal distension (swelling) New increased bleeding (oral or rectal) Fever (chills) Pain in chest area Bloody discharge from nose or mouth Shortness of breath Follow-up Instructions: 
 Call Dr. Yuri Rodriguez for any questions or problems. Telephone # [phone] ENDOSCOPY FINDINGS: 
 Your endoscopy showed grade A esophagitis and small hiatal hernia. Signed By: Leon Wolfe MD   
 8/2/2018  9:33 AM 
  
 
 
 
 
 
ACO Transitions of Care Introducing [facility] [address] Cristobal offers a voluntary care coordination program to provide high quality service and care to [facility] fee-for-service beneficiaries. Azam Segal was designed to help you enhance your health and well-being through the following services: ? Transitions of Care  support for individuals who are transitioning from one care setting to another (example: Hospital to home). ? Chronic and Complex Care Coordination  support for individuals and caregivers of those with serious or chronic illnesses or with more than one chronic (ongoing) condition and those who take a number of different medications. If you meet specific medical criteria, a [facility] [address] may call you directly to coordinate your care with your primary care physician and your other care providers. For questions about the [facility] programs, please, contact your physicians office. For general questions or additional information about Accountable Care Organizations: 
Please visit www.medicare.gov/acos. html or call 1-800-MEDICARE (0-976.805.2290) TTY users should call 8-464.740.8109. Introducing [facility]! Dear Hailey Carpenter: 
Thank you for requesting a Hittahem account. Our records indicate that you already have an active Hittahem account. You can access your account anytime at [email or web address] Did you know that you can access your hospital and ER discharge instructions at any time in Hittahem? You can also review all of your test results from your hospital stay or ER visit. Additional Information If you have questions, please visit the Frequently Asked Questions section of the Hittahem website at [email or web address] Remember, Hittahem is NOT to be used for urgent needs. For medical emergencies, dial 911. Now available from your iPhone and Android! Introducing Fredrick Morton As a [facility] patient, I wanted to make you aware of our electronic visit tool called Fredrick Morton. Glo Bags[employer]agÃƒÂ¡mi Systems allows you to connect within minutes with a medical provider 24 hours a day, seven days a week via a mobile device or tablet or logging into a secure website from your computer. You can access Fredrick RelTelazar from anywhere in the [place]. A virtual visit might be right for you when you have a simple condition and feel like you just dont want to get out of bed, or cant get away from work for an appointment, when your regular [facility] provider is not available (evenings, weekends or holidays), or when youre out of town and need minor care. Electronic visits cost only $49 and if the [facility]/7 provider determines a prescription is needed to treat your condition, one can be electronically transmitted to a nearby pharmacy*. Please take a moment to enroll today if you have not already done so. The enrollment process is free and takes just a few minutes.   To enroll, please download the [facility]/agÃ[employer]mi Systems sheng to your tablet or phone, or visit [email or web address] to enroll on your computer. And, as an [address] [place] [address] patient with a Triggit account, the results of your visits will be scanned into your electronic medical record and your primary care provider will be able to view the scanned results. We urge you to continue to see your regular [facility] provider for your ongoing medical care. And while your primary care provider may not be the one available when you seek a RealBio Technologyvamsifin virtual visit, the peace of mind you get from getting a real diagnosis real time can be priceless. For more information on Kenandy, view our Frequently Asked Questions (FAQs) at [email or web address]. Sincerely, 
 
Sawyer Pemberton MD 
Chief Medical Officer Hiram8 Tamela Cristobal *:  certain medications cannot be prescribed via Kenandy Providers Seen During Your Hospitalization Provider Specialty Primary office phone Prince Reeder MD Gastroenterology [phone] Your Primary Care Physician (PCP) Primary Care Physician Office Phone Office Fax [place], [address] [facility] [phone] You are allergic to the following Allergen Reactions Zetia (Ezetimibe) Rash Recent Documentation Height Weight Breastfeeding? BMI OB Status Smoking Status 1.651 m 98 kg No 35.94 kg/m2 Postmenopausal Never Smoker Emergency Contacts Name Discharge Info Relation Home Work Mobile Onur Garcia CAREGIVER [3] Spouse [3] [phone] Patient Belongings The following personal items are in your possession at time of discharge: 
  Dental Appliances: None  Visual Aid: Glasses Please provide this summary of care documentation to your next provider. Signatures-by signing, you are acknowledging that this After Visit Summary has been reviewed with you and you have received a copy. Patient Signature:  ____________________________________________________________ Date:  ____________________________________________________________  
  
Burlene Frank Provider Signature:  ____________________________________________________________ Date:  ____________________________________________________________

## 2018-08-10 ENCOUNTER — HOSPITAL ENCOUNTER (OUTPATIENT)
Dept: GENERAL RADIOLOGY | Age: 73
Discharge: HOME | End: 2018-08-10
Attending: INTERNAL MEDICINE
Payer: MEDICARE

## 2018-08-10 ENCOUNTER — HOSPITAL ENCOUNTER (OUTPATIENT)
Dept: LAB | Age: 73
Discharge: HOME | End: 2018-08-10
Payer: MEDICARE

## 2018-08-10 ENCOUNTER — OFFICE VISIT (OUTPATIENT)
Dept: INTERNAL MEDICINE CLINIC | Age: 73
End: 2018-08-10

## 2018-08-10 VITALS
DIASTOLIC BLOOD PRESSURE: 54 MMHG | TEMPERATURE: 98.9 F | SYSTOLIC BLOOD PRESSURE: 139 MMHG | WEIGHT: 223 LBS | RESPIRATION RATE: 14 BRPM | HEART RATE: 76 BPM | BODY MASS INDEX: 37.15 KG/M2 | HEIGHT: 65 IN | OXYGEN SATURATION: 98 %

## 2018-08-10 DIAGNOSIS — G89.29 CHRONIC PAIN OF LEFT KNEE: ICD-10-CM

## 2018-08-10 DIAGNOSIS — K21.00 GASTROESOPHAGEAL REFLUX DISEASE WITH ESOPHAGITIS: ICD-10-CM

## 2018-08-10 DIAGNOSIS — E03.4 HYPOTHYROIDISM DUE TO ACQUIRED ATROPHY OF THYROID: Primary | ICD-10-CM

## 2018-08-10 DIAGNOSIS — R73.01 IMPAIRED FASTING GLUCOSE: ICD-10-CM

## 2018-08-10 DIAGNOSIS — F34.1 DYSTHYMIA: ICD-10-CM

## 2018-08-10 DIAGNOSIS — E78.2 MIXED HYPERLIPIDEMIA: ICD-10-CM

## 2018-08-10 DIAGNOSIS — M25.562 CHRONIC PAIN OF LEFT KNEE: ICD-10-CM

## 2018-08-10 DIAGNOSIS — J45.20 MILD INTERMITTENT REACTIVE AIRWAY DISEASE WITHOUT COMPLICATION: ICD-10-CM

## 2018-08-10 PROCEDURE — 73562 X-RAY EXAM OF KNEE 3: CPT

## 2018-08-10 PROCEDURE — 80061 LIPID PANEL: CPT

## 2018-08-10 PROCEDURE — 84443 ASSAY THYROID STIM HORMONE: CPT

## 2018-08-10 PROCEDURE — 84439 ASSAY OF FREE THYROXINE: CPT

## 2018-08-10 PROCEDURE — 80053 COMPREHEN METABOLIC PANEL: CPT

## 2018-08-10 PROCEDURE — 85027 COMPLETE CBC AUTOMATED: CPT

## 2018-08-10 PROCEDURE — 83036 HEMOGLOBIN GLYCOSYLATED A1C: CPT

## 2018-08-10 RX ORDER — OMEPRAZOLE 20 MG/1
20 CAPSULE, DELAYED RELEASE ORAL DAILY
COMMUNITY
Start: 2018-06-29 | End: 2020-05-07

## 2018-08-10 RX ORDER — FLUTICASONE PROPIONATE AND SALMETEROL 50; 250 UG/1; UG/1
POWDER RESPIRATORY (INHALATION)
COMMUNITY
Start: 2018-07-03 | End: 2020-05-07

## 2018-08-10 NOTE — PROGRESS NOTES
HISTORY OF PRESENT ILLNESS  Keagan Smith is a 81 y.o. female. HPI  Pt will be traveling to [facility] and taking road trip. [facility] [place]. Pt followed up for endoscopy with Dr. Eugene Suero on 8/02/18. Results showed esophagitis and acid reflux. Anesthesiologist recommended that pt follow up for sleep apnea testing. She currently takes Aleve for sleep. Left Knee Pain: Pt experiences buckling and weakness in left knee. She needs to walk one step at time when taking stairs. Right Foot Pain: Pt suspects bone spur on right foot. She is considering f/u with podiatrist.    hypothyroidism. Lab Results   Component Value Date/Time    TSH 3.020 02/02/2018 10:19 AM     Thyroid ROS: denies fatigue, weight changes, heat/cold intolerance, bowel/skin changes or CVS symptoms. Pt continues on Synthroid. She notes stable bowels. Hyperlipidemia:  Cardiovascular risk analysis - 81 y.o. female LDL goal is under 130. ROS: taking medications as instructed, no medication side effects noted, no TIA's, no chest pain on exertion, no dyspnea on exertion, no swelling of ankles. Tolerating meds, no myalgias or other side effects noted  New concerns: Her last LDL was 113 on 2/02/18. Pt continues on Lipitor. Impaired Fasting Glucose: Stable condition. Pt's last A1c was 5.7 on 2/03/18. Mood: Stable, and doing well. GERD: Stable, and well-managed. Pt continues on Prilosec. Review of Systems   All other systems reviewed and are negative. Physical Exam   Constitutional: She is oriented to person, place, and time. She appears well-developed and well-nourished. HENT:   Head: Normocephalic and atraumatic. Right Ear: External ear normal.   Left Ear: External ear normal.   Nose: Nose normal.   Mouth/Throat: Oropharynx is clear and moist.   Eyes: Conjunctivae and EOM are normal.   Neck: Normal range of motion. Neck supple. Carotid bruit is not present.  No thyroid mass and no thyromegaly present. Cardiovascular: Normal rate, regular rhythm, S1 normal, S2 normal, normal heart sounds and intact distal pulses. Pulmonary/Chest: Effort normal and breath sounds normal.   Abdominal: Soft. Normal appearance and bowel sounds are normal. There is no hepatosplenomegaly. There is no tenderness. Musculoskeletal: Normal range of motion. Neurological: She is alert and oriented to person, place, and time. She has normal strength. No cranial nerve deficit or sensory deficit. Coordination normal.   Skin: Skin is warm, dry and intact. No abrasion and no rash noted. Psychiatric: She has a normal mood and affect. Her behavior is normal. Judgment and thought content normal.   Nursing note and vitals reviewed. ASSESSMENT and PLAN  Diagnoses and all orders for this visit:    1. Hypothyroidism due to acquired atrophy of thyroid  Thyroid stable. I do not recommend a change in medications.  -     TSH 3RD GENERATION  -     T4, FREE  -     TSH 3RD GENERATION  -     T4, FREE    2. Dysthymia  Stable, and doing well. Pt currently not taking any meds. 3. Mixed hyperlipidemia  Stable, patient tolerating meds, no myalgias. I do not recommend any change in medications. -     CBC W/O DIFF  -     METABOLIC PANEL, COMPREHENSIVE  -     LIPID PANEL  -     LIPID PANEL  -     METABOLIC PANEL, COMPREHENSIVE    4. Impaired fasting glucose  Stable condition. Pt's last A1c was 5.7 on 2/03/18. Pt currently not taking any meds. Will monitor for lab results.  -     HEMOGLOBIN A1C WITH EAG  -     HEMOGLOBIN A1C WITH EAG    5. Chronic pain of left knee  Pt will follow up for knee XR. Will monitor for imaging results. Pt currently not taking any meds. -     XR KNEE LT MAX 2 VWS; Future    6. Gastroesophageal reflux disease with esophagitis  Stable, and doing well. Continue current medications.      7. Mild intermittent reactive airway disease without complication  Pt will follow up with pulmonologist to inquire about sleep apnea testing. Will monitor for updates. Lab results and schedule of future lab studies reviewed with patient. Reviewed diet, exercise and weight control. Written by German Zamarripa, as dictated by Tia Kincaid MD.   This note will not be viewable in [address] Current diagnosis and concerns discussed with pt at length. Understands risks and benefits or current treatment plan and medications and accepts the treatment and medication with any possible risks. Pt asks appropriate questions which were answered. Pt instructed to call with any concerns or problems.

## 2018-08-11 LAB
ALBUMIN SERPL-MCNC: 4.4 G/DL (ref 3.5–4.8)
ALBUMIN/GLOB SERPL: 2.1 {RATIO} (ref 1.2–2.2)
ALP SERPL-CCNC: 102 IU/L (ref 39–117)
ALT SERPL-CCNC: 20 IU/L (ref 0–32)
AST SERPL-CCNC: 22 IU/L (ref 0–40)
BILIRUB SERPL-MCNC: 0.4 MG/DL (ref 0–1.2)
BUN SERPL-MCNC: 14 MG/DL (ref 8–27)
BUN/CREAT SERPL: 17 (ref 12–28)
CALCIUM SERPL-MCNC: 9 MG/DL (ref 8.7–10.3)
CHLORIDE SERPL-SCNC: 105 MMOL/L (ref 96–106)
CHOLEST SERPL-MCNC: 193 MG/DL (ref 100–199)
CO2 SERPL-SCNC: 23 MMOL/L (ref 20–29)
CREAT SERPL-MCNC: 0.83 MG/DL (ref 0.57–1)
ERYTHROCYTE [DISTWIDTH] IN BLOOD BY AUTOMATED COUNT: 13.8 % (ref 12.3–15.4)
EST. AVERAGE GLUCOSE BLD GHB EST-MCNC: 123 MG/DL
GLOBULIN SER CALC-MCNC: 2.1 G/DL (ref 1.5–4.5)
GLUCOSE SERPL-MCNC: 101 MG/DL (ref 65–99)
HBA1C MFR BLD: 5.9 % (ref 4.8–5.6)
HCT VFR BLD AUTO: 39.8 % (ref 34–46.6)
HDLC SERPL-MCNC: 67 MG/DL
HGB BLD-MCNC: 13.6 G/DL (ref 11.1–15.9)
INTERPRETATION, 910389: NORMAL
LDLC SERPL CALC-MCNC: 108 MG/DL (ref 0–99)
MCH RBC QN AUTO: 29.4 PG (ref 26.6–33)
MCHC RBC AUTO-ENTMCNC: 34.2 G/DL (ref 31.5–35.7)
MCV RBC AUTO: 86 FL (ref 79–97)
PLATELET # BLD AUTO: 244 X10E3/UL (ref 150–379)
POTASSIUM SERPL-SCNC: 4.8 MMOL/L (ref 3.5–5.2)
PROT SERPL-MCNC: 6.5 G/DL (ref 6–8.5)
RBC # BLD AUTO: 4.62 X10E6/UL (ref 3.77–5.28)
SODIUM SERPL-SCNC: 142 MMOL/L (ref 134–144)
T4 FREE SERPL-MCNC: 1.52 NG/DL (ref 0.82–1.77)
TRIGL SERPL-MCNC: 88 MG/DL (ref 0–149)
TSH SERPL DL<=0.005 MIU/L-ACNC: 0.67 UIU/ML (ref 0.45–4.5)
VLDLC SERPL CALC-MCNC: 18 MG/DL (ref 5–40)
WBC # BLD AUTO: 7 X10E3/UL (ref 3.4–10.8)

## 2018-10-18 ENCOUNTER — HOSPITAL ENCOUNTER (OUTPATIENT)
Dept: CT IMAGING | Age: 73
Discharge: HOME | End: 2018-10-18
Attending: INTERNAL MEDICINE
Payer: MEDICARE

## 2018-10-18 DIAGNOSIS — R91.1 SOLITARY PULMONARY NODULE PRESENT ON COMPUTED TOMOGRAPHY OF LUNG: ICD-10-CM

## 2018-10-18 PROCEDURE — 71250 CT THORAX DX C-: CPT

## 2018-12-04 DIAGNOSIS — N39.498 OTHER URINARY INCONTINENCE: ICD-10-CM

## 2018-12-04 RX ORDER — TOLTERODINE 4 MG/1
CAPSULE, EXTENDED RELEASE ORAL
Qty: 30 CAP | Refills: 2 | Status: SHIPPED | OUTPATIENT
Start: 2018-12-04 | End: 2019-04-07 | Stop reason: SDUPTHER

## 2019-01-12 DIAGNOSIS — E03.9 ACQUIRED HYPOTHYROIDISM: ICD-10-CM

## 2019-01-12 RX ORDER — LEVOTHYROXINE SODIUM 175 UG/1
TABLET ORAL
Qty: 90 TAB | Refills: 0 | Status: SHIPPED | OUTPATIENT
Start: 2019-01-12 | End: 2019-03-11 | Stop reason: SDUPTHER

## 2019-02-06 ENCOUNTER — HOSPITAL ENCOUNTER (OUTPATIENT)
Dept: MAMMOGRAPHY | Age: 74
Discharge: HOME | End: 2019-02-06
Attending: INTERNAL MEDICINE
Payer: MEDICARE

## 2019-02-06 DIAGNOSIS — Z12.39 SCREENING BREAST EXAMINATION: ICD-10-CM

## 2019-02-06 PROCEDURE — 77067 SCR MAMMO BI INCL CAD: CPT

## 2019-03-11 DIAGNOSIS — F32.A DEPRESSION: ICD-10-CM

## 2019-03-11 DIAGNOSIS — E03.9 ACQUIRED HYPOTHYROIDISM: ICD-10-CM

## 2019-03-12 RX ORDER — LEVOTHYROXINE SODIUM 175 UG/1
TABLET ORAL
Qty: 90 TAB | Refills: 0 | Status: SHIPPED | OUTPATIENT
Start: 2019-03-12 | End: 2019-05-12 | Stop reason: SDUPTHER

## 2019-03-12 RX ORDER — ESCITALOPRAM OXALATE 20 MG/1
TABLET ORAL
Qty: 90 TAB | Refills: 0 | Status: SHIPPED | OUTPATIENT
Start: 2019-03-12 | End: 2019-05-12 | Stop reason: SDUPTHER

## 2019-04-22 ENCOUNTER — HOSPITAL ENCOUNTER (OUTPATIENT)
Dept: LAB | Age: 74
Discharge: HOME | End: 2019-04-22
Payer: MEDICARE

## 2019-04-22 PROCEDURE — 80061 LIPID PANEL: CPT

## 2019-04-22 PROCEDURE — 84443 ASSAY THYROID STIM HORMONE: CPT

## 2019-04-22 PROCEDURE — 84439 ASSAY OF FREE THYROXINE: CPT

## 2019-04-22 PROCEDURE — 36415 COLL VENOUS BLD VENIPUNCTURE: CPT

## 2019-04-22 PROCEDURE — 80053 COMPREHEN METABOLIC PANEL: CPT

## 2019-04-22 PROCEDURE — 83036 HEMOGLOBIN GLYCOSYLATED A1C: CPT

## 2019-04-23 LAB
ALBUMIN SERPL-MCNC: 4.1 G/DL (ref 3.5–4.8)
ALBUMIN/GLOB SERPL: 2.1 {RATIO} (ref 1.2–2.2)
ALP SERPL-CCNC: 113 IU/L (ref 39–117)
ALT SERPL-CCNC: 25 IU/L (ref 0–32)
AST SERPL-CCNC: 22 IU/L (ref 0–40)
BILIRUB SERPL-MCNC: 0.5 MG/DL (ref 0–1.2)
BUN SERPL-MCNC: 13 MG/DL (ref 8–27)
BUN/CREAT SERPL: 17 (ref 12–28)
CALCIUM SERPL-MCNC: 9 MG/DL (ref 8.7–10.3)
CHLORIDE SERPL-SCNC: 104 MMOL/L (ref 96–106)
CHOLEST SERPL-MCNC: 189 MG/DL (ref 100–199)
CO2 SERPL-SCNC: 25 MMOL/L (ref 20–29)
CREAT SERPL-MCNC: 0.76 MG/DL (ref 0.57–1)
EST. AVERAGE GLUCOSE BLD GHB EST-MCNC: 123 MG/DL
GLOBULIN SER CALC-MCNC: 2 G/DL (ref 1.5–4.5)
GLUCOSE SERPL-MCNC: 102 MG/DL (ref 65–99)
HBA1C MFR BLD: 5.9 % (ref 4.8–5.6)
HDLC SERPL-MCNC: 60 MG/DL
INTERPRETATION, 910389: NORMAL
LDLC SERPL CALC-MCNC: 114 MG/DL (ref 0–99)
POTASSIUM SERPL-SCNC: 4.8 MMOL/L (ref 3.5–5.2)
PROT SERPL-MCNC: 6.1 G/DL (ref 6–8.5)
SODIUM SERPL-SCNC: 141 MMOL/L (ref 134–144)
T4 FREE SERPL-MCNC: 1.72 NG/DL (ref 0.82–1.77)
TRIGL SERPL-MCNC: 73 MG/DL (ref 0–149)
TSH SERPL DL<=0.005 MIU/L-ACNC: 0.18 UIU/ML (ref 0.45–4.5)
VLDLC SERPL CALC-MCNC: 15 MG/DL (ref 5–40)

## 2019-05-07 ENCOUNTER — HOSPITAL ENCOUNTER (OUTPATIENT)
Dept: LAB | Age: 74
Discharge: HOME | End: 2019-05-07
Payer: MEDICARE

## 2019-05-07 ENCOUNTER — OFFICE VISIT (OUTPATIENT)
Dept: INTERNAL MEDICINE CLINIC | Age: 74
End: 2019-05-07

## 2019-05-07 VITALS
HEIGHT: 65 IN | SYSTOLIC BLOOD PRESSURE: 132 MMHG | DIASTOLIC BLOOD PRESSURE: 68 MMHG | HEART RATE: 78 BPM | BODY MASS INDEX: 36.49 KG/M2 | RESPIRATION RATE: 18 BRPM | WEIGHT: 219 LBS | OXYGEN SATURATION: 96 % | TEMPERATURE: 98.8 F

## 2019-05-07 DIAGNOSIS — K21.00 GASTROESOPHAGEAL REFLUX DISEASE WITH ESOPHAGITIS: ICD-10-CM

## 2019-05-07 DIAGNOSIS — Z23 ENCOUNTER FOR IMMUNIZATION: ICD-10-CM

## 2019-05-07 DIAGNOSIS — Z00.00 MEDICARE ANNUAL WELLNESS VISIT, SUBSEQUENT: Primary | ICD-10-CM

## 2019-05-07 DIAGNOSIS — N39.498 OTHER URINARY INCONTINENCE: ICD-10-CM

## 2019-05-07 DIAGNOSIS — E03.9 ACQUIRED HYPOTHYROIDISM: ICD-10-CM

## 2019-05-07 DIAGNOSIS — Z78.9 HISTORY OF MEASLES, MUMPS, RUBELLA (MMR) VACCINATION UNKNOWN: ICD-10-CM

## 2019-05-07 DIAGNOSIS — F34.1 DYSTHYMIA: ICD-10-CM

## 2019-05-07 DIAGNOSIS — E78.2 MIXED HYPERLIPIDEMIA: ICD-10-CM

## 2019-05-07 PROCEDURE — 36415 COLL VENOUS BLD VENIPUNCTURE: CPT

## 2019-05-07 PROCEDURE — 86765 RUBEOLA ANTIBODY: CPT

## 2019-05-07 RX ORDER — CALCIUM CARB/VITAMIN D3/VIT K1 650MG-12.5
TABLET,CHEWABLE ORAL
COMMUNITY
End: 2019-11-07

## 2019-05-07 NOTE — PATIENT INSTRUCTIONS
Medicare Wellness Visit, Female     The best way to live healthy is to have a lifestyle where you eat a well-balanced diet, exercise regularly, limit alcohol use, and quit all forms of tobacco/nicotine, if applicable. Regular preventive services are another way to keep healthy. Preventive services (vaccines, screening tests, monitoring & exams) can help personalize your care plan, which helps you manage your own care. Screening tests can find health problems at the earliest stages, when they are easiest to treat. [facility] follows the current, evidence-based guidelines published by the [facility] Leroy Anthony ([facility]) when recommending preventive services for our patients. Because we follow these guidelines, sometimes recommendations change over time as research supports it. (For example, mammograms used to be recommended annually. Even though Medicare will still pay for an annual mammogram, the newer guidelines recommend a mammogram every two years for women of average risk.)  Of course, you and your doctor may decide to screen more often for some diseases, based on your risk and your health status. Preventive services for you include:  - Medicare offers their members a free annual wellness visit, which is time for you and your primary care provider to discuss and plan for your preventive service needs. Take advantage of this benefit every year!  -All adults over the age of 72 should receive the recommended pneumonia vaccines. Current USPSTF guidelines recommend a series of two vaccines for the best pneumonia protection.   -All adults should have a flu vaccine yearly and a tetanus vaccine every 10 years. All adults age 61 and older should receive a shingles vaccine once in their lifetime.    -A bone mass density test is recommended when a woman turns 65 to screen for osteoporosis. This test is only recommended one time, as a screening.  Some providers will use this same test as a disease monitoring tool if you already have osteoporosis. -All adults age 38-68 who are overweight should have a diabetes screening test once every three years.   -Other screening tests and preventive services for persons with diabetes include: an eye exam to screen for diabetic retinopathy, a kidney function test, a foot exam, and stricter control over your cholesterol.   -Cardiovascular screening for adults with routine risk involves an electrocardiogram (ECG) at intervals determined by your doctor.   -Colorectal cancer screenings should be done for adults age 54-65 with no increased risk factors for colorectal cancer. There are a number of acceptable methods of screening for this type of cancer. Each test has its own benefits and drawbacks. Discuss with your doctor what is most appropriate for you during your annual wellness visit. The different tests include: colonoscopy (considered the best screening method), a fecal occult blood test, a fecal DNA test, and sigmoidoscopy. -Breast cancer screenings are recommended every other year for women of normal risk, age 54-69.  -Cervical cancer screenings for women over age 72 are only recommended with certain risk factors.   -All adults born between [facility] should be screened once for Hepatitis C.      Here is a list of your current Health Maintenance items (your personalized list of preventive services) with a due date:  Health Maintenance Due   Topic Date Due    Shingles Vaccine (1 of 2) 08/22/1997    Pneumococcal Vaccine (2 of 2 - PPSV23) 08/08/2018    Annual Well Visit  02/09/2019    Glaucoma Screening   05/11/2019

## 2019-05-07 NOTE — PROGRESS NOTES
HISTORY OF PRESENT ILLNESS  Kalina Matthews is a 72 y.o. female. HPI  SUBJECTIVE: Kalina Matthews is a 72 y.o. female here for follow up of hypothyroidism. Lab Results   Component Value Date/Time    TSH 0.184 (L) 04/22/2019 10:19 AM     Thyroid ROS: denies fatigue, weight changes, heat/cold intolerance, bowel/skin changes or CVS symptoms. Subjective: Kalina Matthews is a 72 y.o. female with hyperlipidemia. Cardiovascular risk analysis - 72 y.o. female LDL goal is under 130. ROS: taking medications as instructed, no medication side effects noted, no TIA's, no chest pain on exertion, no dyspnea on exertion, no swelling of ankles. Tolerating meds, no myalgias or other side effects noted  New concerns: tolerating medicine . Urinary incontinence- tolerating medicine and doing well    Carlos Nunnery- tolerating medicine and doing well     Glucose intolerance-  working on exercise and eating right ; her knees hurt and her left knee hurts     Some days she feels well and other days not   Dysthymia- tolerating medicine and doing well and feeling well    Over the winter she used the advair and does not need in spring     Review of Systems   Constitutional: Negative for chills, diaphoresis, fever, malaise/fatigue and weight loss. HENT: Negative for congestion, ear pain, hearing loss, sore throat and tinnitus. Eyes: Negative for blurred vision and double vision. Respiratory: Negative for cough, hemoptysis, sputum production, shortness of breath and wheezing. Cardiovascular: Negative for chest pain, palpitations, orthopnea, claudication, leg swelling and PND. Gastrointestinal: Negative for abdominal pain, blood in stool, constipation, diarrhea, heartburn, nausea and vomiting. Genitourinary: Negative for dysuria, flank pain, frequency, hematuria and urgency. Musculoskeletal: Negative for back pain, joint pain, myalgias and neck pain. Skin: Negative.     Neurological: Negative for dizziness, tingling, sensory change, speech change, focal weakness, seizures, loss of consciousness, weakness and headaches. Endo/Heme/Allergies: Negative for environmental allergies and polydipsia. Does not bruise/bleed easily. Psychiatric/Behavioral: Negative for depression, memory loss, substance abuse and suicidal ideas. The patient is not nervous/anxious and does not have insomnia. Physical Exam   Constitutional: She is oriented to person, place, and time. She appears well-developed and well-nourished. HENT:   Head: Normocephalic and atraumatic. Right Ear: External ear normal.   Left Ear: External ear normal.   Nose: Nose normal.   Mouth/Throat: Oropharynx is clear and moist.   Eyes: Pupils are equal, round, and reactive to light. Conjunctivae and EOM are normal.   Neck: Normal range of motion. Neck supple. Cardiovascular: Normal rate, regular rhythm, normal heart sounds and intact distal pulses. Pulmonary/Chest: Effort normal and breath sounds normal. Right breast exhibits no inverted nipple, no mass, no nipple discharge, no skin change and no tenderness. Left breast exhibits no inverted nipple, no mass, no nipple discharge, no skin change and no tenderness. No breast swelling, tenderness, discharge or bleeding. Breasts are symmetrical.   Abdominal: Soft. Bowel sounds are normal.   Genitourinary: Rectal exam shows anal tone normal. No breast swelling, tenderness, discharge or bleeding. Musculoskeletal: Normal range of motion. Neurological: She is alert and oriented to person, place, and time. Skin: Skin is warm and dry. Psychiatric: She has a normal mood and affect. Her behavior is normal. Judgment and thought content normal.   Nursing note and vitals reviewed. ASSESSMENT and PLAN  Diagnoses and all orders for this visit:    1. Medicare annual wellness visit, subsequent    2.  History of measles, mumps, rubella (MMR) vaccination unknown  -     MEASLES/MUMPS/RUBELLA IMMUNITY    3. Encounter for immunization  -     PNEUMOCOCCAL POLYSACCHARIDE VACCINE, 23-VALENT, ADULT OR IMMUNOSUPPRESSED PT DOSE,    4. Dysthymia-cont with lexapro and tolerating medicine     5. Acquired hypothyroidism-stable on current medicine     6. Mixed hyperlipidemia-stable on current medicine     7. Gastroesophageal reflux disease with esophagitis-cont with prilosec     8. Other urinary incontinence-cont with detrol tolerating medicine     This note will not be viewable in MyChart.     She is having some left knee pain and we offered to do therapy and she is going to think about it and let me know     lab results and schedule of future lab studies reviewed with patient  reviewed diet, exercise and weight control  cardiovascular risk and specific lipid/LDL goals reviewed  reviewed medications and side effects in detail

## 2019-05-07 NOTE — LETTER
May 7, 2019 Francy Patrick [address] [place] [address] [id number] [phone] Dear Rukhsana Wood: 
Thank you for requesting access to [facility]. Please follow the instructions below to securely access and download your online medical record. [facility] allows you to send messages to your doctor, view your test results, renew your prescriptions, schedule appointments, and more. How Do I Sign Up? 1. In your internet browser, go to [email or web address]. 2. Click on the First Time User? Click Here link in the Sign In box. You will see the New Member Sign Up page. 3. Enter your [facility] Access Code exactly as it appears below. You will not need to use this code after youve completed the sign-up process. If you do not sign up before the expiration date, you must request a new code. [facility] Access Code: [email or web address] Expires: 6/21/2019  1:57 PM  
 
4. Enter the last four digits of your Social Security Number (xxxx) and Date of Birth (mm/dd/yyyy) as indicated and click Submit. You will be taken to the next sign-up page. 5. Create a [facility] ID. This will be your [facility] login ID and cannot be changed, so think of one that is secure and easy to remember. 6. Create a [facility] password. You can change your password at any time. 7. Enter your Password Reset Question and Answer. This can be used at a later time if you forget your password. 8. Enter your e-mail address. You will receive e-mail notification when new information is available in [address]. 9. Click Sign Up. You can now view and download portions of your medical record. 10. Click the Download Summary menu link to download a portable copy of your medical information. Additional Information If you have questions, please visit the Frequently Asked Questions section of the [employer] website at [email or web address] Remember, [facility] is NOT to be used for urgent needs. For medical emergencies, dial 911. Now available from your iPhone and Android! Sincerely, [employer]

## 2019-05-07 NOTE — PROGRESS NOTES
This is the Subsequent Medicare Annual Wellness Exam, performed 12 months or more after the Initial AWV or the last Subsequent AWV    I have reviewed the patient's medical history in detail and updated the computerized patient record. History     Past Medical History:   Diagnosis Date    Cancer [facility])     thyroid    Chronic obstructive pulmonary disease (HCC)     mild    Depression     Hyperlipidemia     Unspecified hypothyroidism 2009    thyroid cancer      Past Surgical History:   Procedure Laterality Date    HX  SECTION      HX CYST INCISION AND DRAINAGE Left     neg    HX HEENT      thyroid    THYROIDECTOMY      UPPER GI ENDOSCOPY,BIOPSY  2018          Current Outpatient Medications   Medication Sig Dispense Refill    calcium-vitamin D3-vitamin K (VIACTIV) 650 mg-12.5 mcg-40 mcg chew Take  by mouth.  tolterodine ER (DETROL LA) 4 mg ER capsule TAKE ONE CAPSULE BY MOUTH DAILY 30 Cap 5    escitalopram oxalate (LEXAPRO) 20 mg tablet TAKE ONE TABLET BY MOUTH DAILY 90 Tab 0    levothyroxine (SYNTHROID) 175 mcg tablet TAKE ONE TABLET BY MOUTH DAILY BEFORE BREAKFAST 90 Tab 0    atorvastatin (LIPITOR) 10 mg tablet TAKE ONE TABLET BY MOUTH DAILY 90 Tab 1    omeprazole (PRILOSEC) 20 mg capsule Take 20 mg by mouth daily.  naproxen sodium (ALEVE) 220 mg tablet Take 220 mg by mouth two (2) times daily (with meals).  ADVAIR DISKUS 250-50 mcg/dose diskus inhaler       fluticasone-salmeterol (ADVAIR) 250-50 mcg/dose diskus inhaler Take 1 Puff by inhalation two (2) times a day for 90 days. 3 Inhaler 1    tolterodine ER (DETROL LA) 4 mg ER capsule Take 1 Cap by mouth daily for 90 days. 90 Cap 0    fluticasone-salmeterol (ADVAIR) 100-50 mcg/dose diskus inhaler Take 1 Puff by inhalation two (2) times a day. 3 Inhaler 1    tolterodine ER (DETROL LA) 4 mg ER capsule Take 1 capsule by mouth daily for 30 days.  30 capsule 3     Allergies   Allergen Reactions    Zetia [Ezetimibe] Rash     Family History   Problem Relation Age of Onset    Cancer Mother     Arthritis-osteo Mother     Breast Cancer Mother [de-identified]    Cancer Sister     Thyroid Disease Sister     Cancer Brother     Heart Failure Maternal Grandmother     Heart Failure Maternal Grandfather     Heart Failure Father      Social History     Tobacco Use    Smoking status: Never Smoker    Smokeless tobacco: Never Used   Substance Use Topics    Alcohol use: Yes     Alcohol/week: 4.2 oz     Types: 7 Glasses of wine per week     Patient Active Problem List   Diagnosis Code    Depression F32.9    Hypothyroidism E03.9    Hyperlipidemia E78.5    ACP (advance care planning) Z71.89    Shortness of breath R06.02    Dyslipidemia E78.5    Chest pressure R07.89    CRUZ (dyspnea on exertion) R06.09    Severe obesity (BMI 35.0-39. 9) E66.01       Depression Risk Factor Screening:     3 most recent PHQ Screens 9/28/2015   Little interest or pleasure in doing things Not at all   Feeling down, depressed, irritable, or hopeless Not at all   Total Score PHQ 2 0     Alcohol Risk Factor Screening: You do not drink alcohol or very rarely. Functional Ability and Level of Safety:   Hearing Loss  Hearing is good. Activities of Daily Living  The home contains: no safety equipment. Patient does total self care    Fall Risk  Fall Risk Assessment, last 12 mths 5/7/2019   Able to walk? Yes   Fall in past 12 months? Yes   Fall with injury?  No   Number of falls in past 12 months 1   Fall Risk Score 1       Abuse Screen  Patient is not abused    Cognitive Screening   Evaluation of Cognitive Function:  Has your family/caregiver stated any concerns about your memory: no  Normal    Patient Care Team   Patient Care Team:  Shannan Stafford MD as PCP - General    Assessment/Plan   Education and counseling provided:  Are appropriate based on today's review and evaluation  End-of-Life planning (with patient's consent)    Diagnoses and all orders for this visit:    1.  History of measles, mumps, rubella (MMR) vaccination unknown  -     MEASLES/MUMPS/RUBELLA IMMUNITY        Health Maintenance Due   Topic Date Due    Shingrix Vaccine Age 49> (1 of 2) 08/22/1997    Pneumococcal 65+ years (2 of 2 - PPSV23) 08/08/2018    MEDICARE YEARLY EXAM  02/09/2019    GLAUCOMA SCREENING Q2Y  05/11/2019

## 2019-05-09 LAB
MEV IGG SER IA-ACNC: <25 AU/ML
MUV IGG SER IA-ACNC: <9 AU/ML
RUBV IGG SERPL IA-ACNC: 5.89 INDEX

## 2019-05-10 ENCOUNTER — TELEPHONE (OUTPATIENT)
Dept: INTERNAL MEDICINE CLINIC | Age: 74
End: 2019-05-10

## 2019-05-10 NOTE — TELEPHONE ENCOUNTER
----- Message from Kevin Harvey MD sent at 5/10/2019  7:24 AM EDT -----  She doesn't have the immunity to mumps or rubeola so if she wants we can call in a booster to her pharmacy we can ?  Especially since sh travels as much as she does

## 2019-05-10 NOTE — PROGRESS NOTES
She doesn't have the immunity to mumps or rubeola so if she wants we can call in a booster to her pharmacy we can ?  Especially since sh travels as much as she does

## 2019-05-16 DIAGNOSIS — E78.2 MIXED HYPERLIPIDEMIA: ICD-10-CM

## 2019-05-16 DIAGNOSIS — N39.498 OTHER URINARY INCONTINENCE: ICD-10-CM

## 2019-05-16 DIAGNOSIS — F32.A DEPRESSION: ICD-10-CM

## 2019-05-16 DIAGNOSIS — E03.9 ACQUIRED HYPOTHYROIDISM: ICD-10-CM

## 2019-05-16 DIAGNOSIS — Z23 NEED FOR MEASLES-MUMPS-RUBELLA (MMR) VACCINE: Primary | ICD-10-CM

## 2019-05-16 RX ORDER — ESCITALOPRAM OXALATE 20 MG/1
20 TABLET ORAL DAILY
Qty: 90 TAB | Refills: 1 | Status: SHIPPED | OUTPATIENT
Start: 2019-05-16 | End: 2020-05-07 | Stop reason: SDUPTHER

## 2019-05-16 RX ORDER — TOLTERODINE 4 MG/1
4 CAPSULE, EXTENDED RELEASE ORAL DAILY
Qty: 90 CAP | Refills: 3 | Status: SHIPPED | OUTPATIENT
Start: 2019-05-16 | End: 2020-05-07 | Stop reason: SDUPTHER

## 2019-05-16 RX ORDER — ATORVASTATIN CALCIUM 10 MG/1
10 TABLET, FILM COATED ORAL DAILY
Qty: 90 TAB | Refills: 1 | Status: SHIPPED | OUTPATIENT
Start: 2019-05-16 | End: 2020-05-07 | Stop reason: SDUPTHER

## 2019-05-16 RX ORDER — LEVOTHYROXINE SODIUM 175 UG/1
175 TABLET ORAL
Qty: 90 TAB | Refills: 1 | Status: SHIPPED | OUTPATIENT
Start: 2019-05-16 | End: 2020-05-07 | Stop reason: SDUPTHER

## 2019-10-01 ENCOUNTER — TELEPHONE (OUTPATIENT)
Dept: INTERNAL MEDICINE CLINIC | Age: 74
End: 2019-10-01

## 2019-10-01 DIAGNOSIS — E78.5 HYPERLIPIDEMIA LDL GOAL <130: ICD-10-CM

## 2019-10-01 DIAGNOSIS — E03.9 ACQUIRED HYPOTHYROIDISM: Primary | ICD-10-CM

## 2019-10-01 DIAGNOSIS — R73.03 PREDIABETES: ICD-10-CM

## 2019-10-25 ENCOUNTER — HOSPITAL ENCOUNTER (OUTPATIENT)
Dept: LAB | Age: 74
Discharge: HOME | End: 2019-10-25
Payer: MEDICARE

## 2019-10-25 PROCEDURE — 85027 COMPLETE CBC AUTOMATED: CPT

## 2019-10-25 PROCEDURE — 84439 ASSAY OF FREE THYROXINE: CPT

## 2019-10-25 PROCEDURE — 83036 HEMOGLOBIN GLYCOSYLATED A1C: CPT

## 2019-10-25 PROCEDURE — 80053 COMPREHEN METABOLIC PANEL: CPT

## 2019-10-25 PROCEDURE — 36415 COLL VENOUS BLD VENIPUNCTURE: CPT

## 2019-10-25 PROCEDURE — 80061 LIPID PANEL: CPT

## 2019-10-25 PROCEDURE — 84443 ASSAY THYROID STIM HORMONE: CPT

## 2019-10-26 LAB
ALBUMIN SERPL-MCNC: 4.4 G/DL (ref 3.5–4.8)
ALBUMIN/GLOB SERPL: 1.9 {RATIO} (ref 1.2–2.2)
ALP SERPL-CCNC: 107 IU/L (ref 39–117)
ALT SERPL-CCNC: 18 IU/L (ref 0–32)
AST SERPL-CCNC: 23 IU/L (ref 0–40)
BILIRUB SERPL-MCNC: 0.6 MG/DL (ref 0–1.2)
BUN SERPL-MCNC: 11 MG/DL (ref 8–27)
BUN/CREAT SERPL: 13 (ref 12–28)
CALCIUM SERPL-MCNC: 9.3 MG/DL (ref 8.7–10.3)
CHLORIDE SERPL-SCNC: 103 MMOL/L (ref 96–106)
CHOLEST SERPL-MCNC: 166 MG/DL (ref 100–199)
CO2 SERPL-SCNC: 22 MMOL/L (ref 20–29)
CREAT SERPL-MCNC: 0.84 MG/DL (ref 0.57–1)
ERYTHROCYTE [DISTWIDTH] IN BLOOD BY AUTOMATED COUNT: 12.5 % (ref 12.3–15.4)
EST. AVERAGE GLUCOSE BLD GHB EST-MCNC: 114 MG/DL
GLOBULIN SER CALC-MCNC: 2.3 G/DL (ref 1.5–4.5)
GLUCOSE SERPL-MCNC: 96 MG/DL (ref 65–99)
HBA1C MFR BLD: 5.6 % (ref 4.8–5.6)
HCT VFR BLD AUTO: 39.7 % (ref 34–46.6)
HDLC SERPL-MCNC: 58 MG/DL
HGB BLD-MCNC: 13.4 G/DL (ref 11.1–15.9)
INTERPRETATION, 910389: NORMAL
LDLC SERPL CALC-MCNC: 80 MG/DL (ref 0–99)
MCH RBC QN AUTO: 29.7 PG (ref 26.6–33)
MCHC RBC AUTO-ENTMCNC: 33.8 G/DL (ref 31.5–35.7)
MCV RBC AUTO: 88 FL (ref 79–97)
PLATELET # BLD AUTO: 230 X10E3/UL (ref 150–450)
POTASSIUM SERPL-SCNC: 4.7 MMOL/L (ref 3.5–5.2)
PROT SERPL-MCNC: 6.7 G/DL (ref 6–8.5)
RBC # BLD AUTO: 4.51 X10E6/UL (ref 3.77–5.28)
SODIUM SERPL-SCNC: 143 MMOL/L (ref 134–144)
T4 FREE SERPL-MCNC: 2.16 NG/DL (ref 0.82–1.77)
TRIGL SERPL-MCNC: 140 MG/DL (ref 0–149)
TSH SERPL DL<=0.005 MIU/L-ACNC: 0.1 UIU/ML (ref 0.45–4.5)
VLDLC SERPL CALC-MCNC: 28 MG/DL (ref 5–40)
WBC # BLD AUTO: 6.5 X10E3/UL (ref 3.4–10.8)

## 2019-11-07 ENCOUNTER — OFFICE VISIT (OUTPATIENT)
Dept: INTERNAL MEDICINE CLINIC | Age: 74
End: 2019-11-07

## 2019-11-07 VITALS
HEART RATE: 74 BPM | WEIGHT: 219.2 LBS | TEMPERATURE: 98 F | RESPIRATION RATE: 16 BRPM | OXYGEN SATURATION: 94 % | DIASTOLIC BLOOD PRESSURE: 78 MMHG | HEIGHT: 65 IN | SYSTOLIC BLOOD PRESSURE: 132 MMHG | BODY MASS INDEX: 36.52 KG/M2

## 2019-11-07 DIAGNOSIS — R73.03 PREDIABETES: ICD-10-CM

## 2019-11-07 DIAGNOSIS — K21.00 GASTROESOPHAGEAL REFLUX DISEASE WITH ESOPHAGITIS: ICD-10-CM

## 2019-11-07 DIAGNOSIS — F34.1 DYSTHYMIA: ICD-10-CM

## 2019-11-07 DIAGNOSIS — E78.5 HYPERLIPIDEMIA LDL GOAL <130: ICD-10-CM

## 2019-11-07 DIAGNOSIS — M25.562 CHRONIC PAIN OF LEFT KNEE: ICD-10-CM

## 2019-11-07 DIAGNOSIS — G89.29 CHRONIC PAIN OF LEFT KNEE: ICD-10-CM

## 2019-11-07 DIAGNOSIS — E03.9 ACQUIRED HYPOTHYROIDISM: Primary | ICD-10-CM

## 2019-11-07 NOTE — PROGRESS NOTES
HISTORY OF PRESENT ILLNESS  Lurdes Ny is a 69 y.o. female. HPI  hypothyroidism. Lab Results   Component Value Date/Time    TSH 0.105 (L) 10/25/2019 10:06 AM     Thyroid ROS: denies fatigue, weight changes, heat/cold intolerance, bowel/skin changes or CVS symptoms. Continues on Synthroid daily. Hyperlipidemia:  Cardiovascular risk analysis - 69 y.o. female LDL goal is under 130. ROS: taking medications as instructed, no medication side effects noted, no TIA's, no chest pain on exertion, no dyspnea on exertion, no swelling of ankles. Tolerating meds, no myalgias or other side effects noted  New concerns: Pt's last LDL was 80 on 10/26/19. Continues on Lipitor. She notes she has been doing Conscious cuisine- with [employer]. She gets 5 meals for 2 people- which is $100. Glucose intolerance: Stable and well-managed through diet and exercise. She notes she spent a few weeks cheating on her diet when she had visiting guests recently. Knee pain: She notes persisting knee pain. She notes that while walking she feels knee and foot pain/weakness. But she is able to do some weights in the gym. Pt reports that she is concerned about Pinworms. She notes minor itchiness in her anal area. Elevated BP reading: BP is 169/80 today in office. BP is 132/78 upon recheck. GERD: Stable, pt continues to comply with Prilosec. Mood: Stable, pt continues to comply with Lexapro. Pt endorses rubella, flu, Shingrex and pneumonia  Vaccines over the last few months. She notes using her inhaler PRN. She recently went to the dermatologist and she found some age spots- but had a normal P.E. Pt is reading articles about lace in history. She notes she is traveling to [facility] soon. Review of Systems   All other systems reviewed and are negative. Physical Exam   Constitutional: She is oriented to person, place, and time. She appears well-developed and well-nourished.    HENT:   Head: Normocephalic and atraumatic. Right Ear: External ear normal.   Left Ear: External ear normal.   Nose: Nose normal.   Mouth/Throat: Oropharynx is clear and moist.   Eyes: Pupils are equal, round, and reactive to light. Conjunctivae and EOM are normal.   Neck: Normal range of motion. Neck supple. Cardiovascular: Normal rate, regular rhythm, normal heart sounds and intact distal pulses. Pulmonary/Chest: Effort normal and breath sounds normal. Right breast exhibits no inverted nipple, no mass, no nipple discharge, no skin change and no tenderness. Left breast exhibits no inverted nipple, no mass, no nipple discharge, no skin change and no tenderness. No breast swelling, tenderness, discharge or bleeding. Breasts are symmetrical.   Abdominal: Soft. Bowel sounds are normal.   Genitourinary: Rectum normal and vagina normal. Rectal exam shows anal tone normal and guaiac negative stool. No breast swelling, tenderness, discharge or bleeding. Musculoskeletal: Normal range of motion. Neurological: She is alert and oriented to person, place, and time. Skin: Skin is warm and dry. Psychiatric: She has a normal mood and affect. Her behavior is normal. Judgment and thought content normal.   Nursing note and vitals reviewed. ASSESSMENT and PLAN  Diagnoses and all orders for this visit:    1. Acquired hypothyroidism  Decreased Synthroid compliance to 6 days a weekly. Discussed with pt that her TSH came back 0.105- which is too low- meaning her thyroid is being to suppressed. Will continue to monitor for improvements or changes. 2. Prediabetes  Sugars stable with a healthy food delivery service. Continue to follow diabetic diet and monitor sugars. 3. Hyperlipidemia LDL goal <130  Stable with Lipitor, patient is tolerating medications, no myalgias. I do not recommend any change in medications. 4. Dysthymia  Stable and well-managed with Lexapro and Prozac. No change in medications.      5. Gastroesophageal reflux disease with esophagitis  Stable and well-managed with Prilosec. No change in medications. 6. Chronic pain of left knee   Discussed with pt that 1 year ago- her scans showed moderate narrowing and bony articulations. Informed pt that she had the beginnings of arthritis- and she should consider seeing an ortho specialist. Explained that there is gel injections that she could do that could help her pain. -     REFERRAL TO ORTHOPEDICS    Lab results and schedule of future lab studies reviewed with patient. Reviewed diet, exercise and weight control. Written by Rufina Will, as dictated by Vianey Aguilar MD.     Current diagnosis and concerns discussed with pt at length. Understands risks and benefits or current treatment plan and medications and accepts the treatment and medication with any possible risks. Pt asks appropriate questions which were answered. Pt instructed to call with any concerns or problems. This note will not be viewable in [address]

## 2020-02-03 ENCOUNTER — TELEPHONE (OUTPATIENT)
Dept: INTERNAL MEDICINE CLINIC | Age: 75
End: 2020-02-03

## 2020-02-03 DIAGNOSIS — E03.9 ACQUIRED HYPOTHYROIDISM: Primary | ICD-10-CM

## 2020-02-03 NOTE — TELEPHONE ENCOUNTER
PSR called patient to advise lab orders have been scheduled for 2.4.2020 AM. Patient very thankful for return call & orders.      Thank you

## 2020-02-04 ENCOUNTER — HOSPITAL ENCOUNTER (OUTPATIENT)
Dept: LAB | Age: 75
Discharge: HOME | End: 2020-02-04

## 2020-02-04 DIAGNOSIS — E03.9 ACQUIRED HYPOTHYROIDISM: ICD-10-CM

## 2020-02-04 LAB
ERYTHROCYTE [DISTWIDTH] IN BLOOD BY AUTOMATED COUNT: 12.3 % (ref 11.5–14.5)
HCT VFR BLD AUTO: 41 % (ref 35–47)
HGB BLD-MCNC: 13.2 G/DL (ref 11.5–16)
MCH RBC QN AUTO: 29.3 PG (ref 26–34)
MCHC RBC AUTO-ENTMCNC: 32.2 G/DL (ref 30–36.5)
MCV RBC AUTO: 90.9 FL (ref 80–99)
NRBC # BLD: 0 K/UL (ref 0–0.01)
NRBC BLD-RTO: 0 PER 100 WBC
PLATELET # BLD AUTO: 191 K/UL (ref 150–400)
PMV BLD AUTO: 11.2 FL (ref 8.9–12.9)
RBC # BLD AUTO: 4.51 M/UL (ref 3.8–5.2)
T4 FREE SERPL-MCNC: 1.2 NG/DL (ref 0.8–1.5)
TSH SERPL DL<=0.05 MIU/L-ACNC: 1.22 UIU/ML (ref 0.36–3.74)
WBC # BLD AUTO: 5.6 K/UL (ref 3.6–11)

## 2020-02-06 ENCOUNTER — HOSPITAL ENCOUNTER (EMERGENCY)
Age: 75
End: 2020-02-06
Attending: EMERGENCY MEDICINE

## 2020-02-07 ENCOUNTER — HOSPITAL ENCOUNTER (OUTPATIENT)
Dept: LAB | Age: 75
Discharge: HOME | End: 2020-02-07

## 2020-02-07 ENCOUNTER — OFFICE VISIT (OUTPATIENT)
Dept: INTERNAL MEDICINE CLINIC | Age: 75
End: 2020-02-07

## 2020-02-07 VITALS
BODY MASS INDEX: 36.79 KG/M2 | WEIGHT: 220.8 LBS | OXYGEN SATURATION: 94 % | HEIGHT: 65 IN | SYSTOLIC BLOOD PRESSURE: 105 MMHG | HEART RATE: 64 BPM | TEMPERATURE: 97.5 F | DIASTOLIC BLOOD PRESSURE: 50 MMHG | RESPIRATION RATE: 18 BRPM

## 2020-02-07 DIAGNOSIS — E03.9 ACQUIRED HYPOTHYROIDISM: Primary | ICD-10-CM

## 2020-02-07 DIAGNOSIS — R73.03 PREDIABETES: ICD-10-CM

## 2020-02-07 DIAGNOSIS — R53.83 OTHER FATIGUE: ICD-10-CM

## 2020-02-07 DIAGNOSIS — E78.5 HYPERLIPIDEMIA LDL GOAL <130: ICD-10-CM

## 2020-02-07 LAB
EST. AVERAGE GLUCOSE BLD GHB EST-MCNC: 120 MG/DL
HBA1C MFR BLD: 5.8 % (ref 4–5.6)

## 2020-02-07 NOTE — PROGRESS NOTES
HISTORY OF PRESENT ILLNESS  Fátima Cuba is a 69 y.o. female. HPI   Fatigue: Pt reports that she has been experiencing fluctuating increased fatigue and thirst. She would like her sugars and thyroid checked. She has not been able to find motivation to do anything. She has a healthy diet and sleeps well at night. Denies nausea, change in bowels, or abdominal discomfort. Confirms she continues on Detrol.     hypothyroidism. Lab Results   Component Value Date/Time    TSH 1.22 02/04/2020 10:45 AM     Thyroid ROS: denies weight changes, heat/cold intolerance, bowel/skin changes or CVS symptoms. Hyperlipidemia:  Cardiovascular risk analysis - 69 y.o. female LDL goal is under 130. ROS: taking medications as instructed, no medication side effects noted, no TIA's, no chest pain on exertion, no dyspnea on exertion, no swelling of ankles. Tolerating meds, no myalgias or other side effects noted  New concerns: Pt's last LDL was 80 on 10/25/19. Continues on Lipitor 10 mg. Arthralgia: She notes that her knee pain is due to bone-on-bone osteoporosis but it has improved since exercising more frequently. She continues to experience back pain. She did not go back to PT because they were rude to the pt's and did not help someone who was crying in pain. She continues to do exercises on her own. Prediabetes: Possibly stable, but pt would like the labs rechecked due to increased thirst and fatigue. Review of Systems   Constitutional: Positive for malaise/fatigue. HENT:        Increased thrist   All other systems reviewed and are negative. Physical Exam  Constitutional:       Appearance: Normal appearance. HENT:      Right Ear: Hearing, tympanic membrane and external ear normal.      Left Ear: Hearing, tympanic membrane and external ear normal.      Mouth/Throat:      Mouth: Mucous membranes are moist.      Pharynx: Oropharynx is clear. Cardiovascular:      Rate and Rhythm: Normal rate and regular rhythm. Pulmonary:      Effort: Pulmonary effort is normal.      Breath sounds: Normal breath sounds and air entry. Musculoskeletal: Normal range of motion. Skin:     General: Skin is warm and dry. Neurological:      General: No focal deficit present. Mental Status: She is alert and oriented to person, place, and time. Psychiatric:         Mood and Affect: Mood normal.         Behavior: Behavior normal.         ASSESSMENT and PLAN  Diagnoses and all orders for this visit:    1. Acquired hypothyroidism  Increased Synthroid slightly. Discussed with pt that her fatigue maybe due to slightly decreasing her Synthroid. Discussed with pt that her TSH was more suppressed in the past, and now her TSH is more 'normal'. Explained that her body may just want to be in a certain TSH range. Advised pt to increase Synthroid to 1 tablet 6 days a week and a half tablet on Sunday. Will continue to monitor for improvements or changes. 2. Prediabetes  Presumed stable, will recheck labs today.   -     HEMOGLOBIN A1C WITH EAG; Future    3. Hyperlipidemia LDL goal <130  Stable with Lipitor 10 mg, patient is tolerating medications, no myalgias. I do not recommend any change in medications. 4. Other fatigue  Informed pt that her BP has been low in the past, so today's BP is not out of character and not likely the reason for her fatigue. Discussed with pt that her fatigue maybe due to slightly decreasing her Synthroid. Discussed with pt that her TSH was more suppressed in the past, and now her TSH is more 'normal'. Explained that her body may just want to be in a certain TSH range. Advised pt to increase Synthroid to 1 tablet 6 days a week and a half tablet on Sunday. Recommended pt get a sleep study to r/o PANCHO. -     REFERRAL TO PULMONARY DISEASE    Additional comments: Informed pt that Detrol can cause dry mouth. Lab results and schedule of future lab studies reviewed with patient.   Reviewed diet, exercise and weight control. Written by Aramis Agee, as dictated by Jimmy Ga MD.     Current diagnosis and concerns discussed with pt at length. Understands risks and benefits or current treatment plan and medications and accepts the treatment and medication with any possible risks. Pt asks appropriate questions which were answered. Pt instructed to call with any concerns or problems. This note will not be viewable in [address]

## 2020-02-18 ENCOUNTER — HOSPITAL ENCOUNTER (OUTPATIENT)
Dept: MAMMOGRAPHY | Age: 75
Discharge: HOME | End: 2020-02-18
Attending: INTERNAL MEDICINE
Payer: MEDICARE

## 2020-02-18 DIAGNOSIS — Z12.31 VISIT FOR SCREENING MAMMOGRAM: ICD-10-CM

## 2020-02-18 PROCEDURE — 77067 SCR MAMMO BI INCL CAD: CPT

## 2020-04-28 ENCOUNTER — TELEPHONE (OUTPATIENT)
Dept: INTERNAL MEDICINE CLINIC | Age: 75
End: 2020-04-28

## 2020-04-28 DIAGNOSIS — R73.03 PREDIABETES: Primary | ICD-10-CM

## 2020-04-28 DIAGNOSIS — E78.5 DYSLIPIDEMIA: ICD-10-CM

## 2020-04-28 DIAGNOSIS — E03.9 ACQUIRED HYPOTHYROIDISM: ICD-10-CM

## 2020-04-28 NOTE — TELEPHONE ENCOUNTER
Pt has an appt next Thursday and wants to have her labs drawn before the appt. If you have any questions please call her home number.      Thank you

## 2020-04-28 NOTE — TELEPHONE ENCOUNTER
Pt notified that her labs have been order and she can come by the office to have her labs drawn at her convenience. Pt understood and was thankful for the call.

## 2020-04-29 ENCOUNTER — HOSPITAL ENCOUNTER (OUTPATIENT)
Dept: LAB | Age: 75
Discharge: HOME | End: 2020-04-29

## 2020-04-29 DIAGNOSIS — R73.03 PREDIABETES: ICD-10-CM

## 2020-04-29 DIAGNOSIS — E78.5 DYSLIPIDEMIA: ICD-10-CM

## 2020-04-29 DIAGNOSIS — E03.9 ACQUIRED HYPOTHYROIDISM: ICD-10-CM

## 2020-04-29 LAB
ALBUMIN SERPL-MCNC: 3.9 G/DL (ref 3.5–5)
ALBUMIN/GLOB SERPL: 1.3 {RATIO} (ref 1.1–2.2)
ALP SERPL-CCNC: 112 U/L (ref 45–117)
ALT SERPL-CCNC: 30 U/L (ref 12–78)
ANION GAP SERPL CALC-SCNC: 7 MMOL/L (ref 5–15)
AST SERPL-CCNC: 21 U/L (ref 15–37)
BILIRUB SERPL-MCNC: 0.8 MG/DL (ref 0.2–1)
BUN SERPL-MCNC: 14 MG/DL (ref 6–20)
BUN/CREAT SERPL: 17 (ref 12–20)
CALCIUM SERPL-MCNC: 8.7 MG/DL (ref 8.5–10.1)
CHLORIDE SERPL-SCNC: 106 MMOL/L (ref 97–108)
CHOLEST SERPL-MCNC: 206 MG/DL
CO2 SERPL-SCNC: 26 MMOL/L (ref 21–32)
CREAT SERPL-MCNC: 0.83 MG/DL (ref 0.55–1.02)
ERYTHROCYTE [DISTWIDTH] IN BLOOD BY AUTOMATED COUNT: 12 % (ref 11.5–14.5)
EST. AVERAGE GLUCOSE BLD GHB EST-MCNC: 117 MG/DL
GLOBULIN SER CALC-MCNC: 2.9 G/DL (ref 2–4)
GLUCOSE SERPL-MCNC: 90 MG/DL (ref 65–100)
HBA1C MFR BLD: 5.7 % (ref 4–5.6)
HCT VFR BLD AUTO: 41.3 % (ref 35–47)
HDLC SERPL-MCNC: 55 MG/DL
HDLC SERPL: 3.7 {RATIO} (ref 0–5)
HGB BLD-MCNC: 13.6 G/DL (ref 11.5–16)
LDLC SERPL CALC-MCNC: 133 MG/DL (ref 0–100)
LIPID PROFILE,FLP: ABNORMAL
MCH RBC QN AUTO: 29.6 PG (ref 26–34)
MCHC RBC AUTO-ENTMCNC: 32.9 G/DL (ref 30–36.5)
MCV RBC AUTO: 89.8 FL (ref 80–99)
NRBC # BLD: 0 K/UL (ref 0–0.01)
NRBC BLD-RTO: 0 PER 100 WBC
PLATELET # BLD AUTO: 240 K/UL (ref 150–400)
PMV BLD AUTO: 11.1 FL (ref 8.9–12.9)
POTASSIUM SERPL-SCNC: 4.5 MMOL/L (ref 3.5–5.1)
PROT SERPL-MCNC: 6.8 G/DL (ref 6.4–8.2)
RBC # BLD AUTO: 4.6 M/UL (ref 3.8–5.2)
SODIUM SERPL-SCNC: 139 MMOL/L (ref 136–145)
T4 FREE SERPL-MCNC: 1.6 NG/DL (ref 0.8–1.5)
TRIGL SERPL-MCNC: 90 MG/DL (ref ?–150)
TSH SERPL DL<=0.05 MIU/L-ACNC: 1.04 UIU/ML (ref 0.36–3.74)
VLDLC SERPL CALC-MCNC: 18 MG/DL
WBC # BLD AUTO: 6 K/UL (ref 3.6–11)

## 2020-05-07 ENCOUNTER — OFFICE VISIT (OUTPATIENT)
Dept: INTERNAL MEDICINE CLINIC | Age: 75
End: 2020-05-07

## 2020-05-07 VITALS
BODY MASS INDEX: 36.59 KG/M2 | DIASTOLIC BLOOD PRESSURE: 65 MMHG | HEIGHT: 65 IN | HEART RATE: 80 BPM | SYSTOLIC BLOOD PRESSURE: 138 MMHG | TEMPERATURE: 98.2 F | RESPIRATION RATE: 16 BRPM | WEIGHT: 219.6 LBS | OXYGEN SATURATION: 96 %

## 2020-05-07 DIAGNOSIS — E78.2 MIXED HYPERLIPIDEMIA: ICD-10-CM

## 2020-05-07 DIAGNOSIS — F32.A DEPRESSION: ICD-10-CM

## 2020-05-07 DIAGNOSIS — N39.498 OTHER URINARY INCONTINENCE: ICD-10-CM

## 2020-05-07 DIAGNOSIS — E03.9 ACQUIRED HYPOTHYROIDISM: ICD-10-CM

## 2020-05-07 DIAGNOSIS — R73.03 PREDIABETES: ICD-10-CM

## 2020-05-07 DIAGNOSIS — M25.561 CHRONIC PAIN OF BOTH KNEES: ICD-10-CM

## 2020-05-07 DIAGNOSIS — G89.29 CHRONIC PAIN OF BOTH KNEES: ICD-10-CM

## 2020-05-07 DIAGNOSIS — M25.562 CHRONIC PAIN OF BOTH KNEES: ICD-10-CM

## 2020-05-07 DIAGNOSIS — Z00.00 MEDICARE ANNUAL WELLNESS VISIT, SUBSEQUENT: Primary | ICD-10-CM

## 2020-05-07 DIAGNOSIS — E78.5 HYPERLIPIDEMIA LDL GOAL <130: ICD-10-CM

## 2020-05-07 DIAGNOSIS — F34.1 DYSTHYMIA: ICD-10-CM

## 2020-05-07 RX ORDER — ESCITALOPRAM OXALATE 20 MG/1
20 TABLET ORAL DAILY
Qty: 90 TAB | Refills: 1 | Status: SHIPPED | OUTPATIENT
Start: 2020-05-07 | End: 2020-12-19

## 2020-05-07 RX ORDER — TOLTERODINE 4 MG/1
4 CAPSULE, EXTENDED RELEASE ORAL DAILY
Qty: 90 CAP | Refills: 3 | Status: SHIPPED | OUTPATIENT
Start: 2020-05-07 | End: 2021-03-23 | Stop reason: SDUPTHER

## 2020-05-07 RX ORDER — ATORVASTATIN CALCIUM 10 MG/1
10 TABLET, FILM COATED ORAL DAILY
Qty: 90 TAB | Refills: 1 | Status: SHIPPED | OUTPATIENT
Start: 2020-05-07 | End: 2020-11-04

## 2020-05-07 RX ORDER — LEVOTHYROXINE SODIUM 175 UG/1
175 TABLET ORAL
Qty: 90 TAB | Refills: 1 | Status: SHIPPED | OUTPATIENT
Start: 2020-05-07 | End: 2020-11-04

## 2020-05-07 NOTE — PROGRESS NOTES
This is the Subsequent Medicare Annual Wellness Exam, performed 12 months or more after the Initial AWV or the last Subsequent AWV    I have reviewed the patient's medical history in detail and updated the computerized patient record. History     Patient Active Problem List   Diagnosis Code    Depression F32.9    Hypothyroidism E03.9    Hyperlipidemia E78.5    ACP (advance care planning) Z71.89    Shortness of breath R06.02    Dyslipidemia E78.5    Chest pressure R07.89    CRUZ (dyspnea on exertion) R06.09    Severe obesity (BMI 35.0-39. 9) E66.01     Past Medical History:   Diagnosis Date    Cancer [facility])     thyroid    Chronic obstructive pulmonary disease (HCC)     mild    Depression     Hyperlipidemia     Unspecified hypothyroidism 2009    thyroid cancer      Past Surgical History:   Procedure Laterality Date    HX  SECTION      HX CYST INCISION AND DRAINAGE Left     neg    HX HEENT      thyroid    THYROIDECTOMY      UPPER GI ENDOSCOPY,BIOPSY  2018          Current Outpatient Medications   Medication Sig Dispense Refill    tolterodine ER (DETROL LA) 4 mg ER capsule Take 1 Cap by mouth daily. 90 Cap 3    escitalopram oxalate (LEXAPRO) 20 mg tablet Take 1 Tab by mouth daily. 90 Tab 1    levothyroxine (SYNTHROID) 175 mcg tablet Take 1 Tab by mouth Daily (before breakfast). 90 Tab 1    atorvastatin (LIPITOR) 10 mg tablet Take 1 Tab by mouth daily. 90 Tab 1    naproxen sodium (ALEVE) 220 mg tablet Take 220 mg by mouth two (2) times daily (with meals).  fluticasone-salmeterol (ADVAIR) 250-50 mcg/dose diskus inhaler Take 1 Puff by inhalation two (2) times a day for 90 days. 3 Inhaler 1    tolterodine ER (DETROL LA) 4 mg ER capsule Take 1 Cap by mouth daily for 90 days. 90 Cap 0    tolterodine ER (DETROL LA) 4 mg ER capsule Take 1 capsule by mouth daily for 30 days.  30 capsule 3     Allergies   Allergen Reactions    Zetia [Ezetimibe] Rash       Family History Problem Relation Age of Onset    Cancer Mother     Arthritis-osteo Mother     Breast Cancer Mother [de-identified]    Cancer Sister     Thyroid Disease Sister     Cancer Brother     Heart Failure Maternal Grandmother     Heart Failure Maternal Grandfather     Heart Failure Father      Social History     Tobacco Use    Smoking status: Never Smoker    Smokeless tobacco: Never Used   Substance Use Topics    Alcohol use: Yes     Alcohol/week: 7.0 standard drinks     Types: 7 Glasses of wine per week       Depression Risk Factor Screening:     3 most recent PHQ Screens 9/28/2015   Little interest or pleasure in doing things Not at all   Feeling down, depressed, irritable, or hopeless Not at all   Total Score PHQ 2 0       Alcohol Risk Factor Screening:   Do you average 1 drink per night or more than 7 drinks a week:  No    On any one occasion in the past three months have you have had more than 3 drinks containing alcohol:  No      Functional Ability and Level of Safety:   Hearing: Hearing is good. Activities of Daily Living: The home contains: handrails  Patient does total self care    Ambulation: with no difficulty    Fall Risk:  Fall Risk Assessment, last 12 mths 11/7/2019   Able to walk? Yes   Fall in past 12 months? No   Fall with injury? -   Number of falls in past 12 months -   Fall Risk Score -       Abuse Screen:  Patient is not abused    Cognitive Screening   Has your family/caregiver stated any concerns about your memory: no  Cognitive Screening: Normal - MMSE (Mini Mental Status Exam)    Patient Care Team   Patient Care Team:  Victor Manuel Jacobs MD as PCP - Genesis Brown MD as PCP - [facility]    Assessment/Plan   Education and counseling provided:  Are appropriate based on today's review and evaluation    Diagnoses and all orders for this visit:    1. Medicare annual wellness visit, subsequent    2. Prediabetes    3.  Acquired hypothyroidism- full pill all day and 1/2 pill on Sunday   -     levothyroxine (SYNTHROID) 175 mcg tablet; Take 1 Tab by mouth Daily (before breakfast). 4. Hyperlipidemia LDL goal <130    5. Dysthymia    6. Chronic pain of both knees    7. Other urinary incontinence  -     tolterodine ER (DETROL LA) 4 mg ER capsule; Take 1 Cap by mouth daily. 8. Depression  -     escitalopram oxalate (LEXAPRO) 20 mg tablet; Take 1 Tab by mouth daily. 9. Mixed hyperlipidemia  -     atorvastatin (LIPITOR) 10 mg tablet; Take 1 Tab by mouth daily.         Health Maintenance Due   Topic Date Due    Medicare Yearly Exam  05/07/2020    Colonoscopy  09/10/2020

## 2020-05-07 NOTE — PROGRESS NOTES
HISTORY OF PRESENT ILLNESS  Michael Schafer is a 69 y.o. female. HPI   Prediabetes: Pt endorses wanting to go on a diet since she has been having weight related problems. Pt inquires about drug interactions with current regimen and [facility] Weight [employer] supplements (B12 injections, appetite suppressants, fat burners, modified keto diet.) Pt reports that she has tried to lose weight for 9 years but has not consistently seen results. Pt endorses a fairly healthy diet. Knee Pain: Pt reports increased knee pain and change of gait. Pt notes that her right knee pain is worse than left now which has prevented her from exercising. Pt has seen an orthopedic specialist for her knees who has recommend surgery due to moderate-severe osteoarthritis. Hyperlipidemia:  Cardiovascular risk analysis - 69 y.o. female LDL goal is under 130. ROS: taking medications as instructed, no medication side effects noted, no TIA's, no chest pain on exertion, no dyspnea on exertion, no swelling of ankles. Tolerating meds, no myalgias or other side effects noted  New concerns: Pt's last LDL was 133 on 4/28/20. Complying with Lipitor 10 mg.     hypothyroidism. Lab Results   Component Value Date/Time    TSH 1.04 04/29/2020 10:30 AM     Thyroid ROS: denies fatigue, weight changes, heat/cold intolerance, bowel/skin changes or CVS symptoms. Continues on Synthroid 175 mcg. Pt complies with half tablet on Sunday and full tablet for the rest of the week. COVID Inquiry: Pt reports one time symptom of lightheadedness which dissipated. Pt notes HA and trouble breathing for one day but it was resolved with Zinc and sleep. Pt wonders if she had COVID due to these one time sx. COPD: Pt inquires about her lungs and thinks her COPD is related to weather changes. She was recommended a sleep test by her pulmonologist but it was canceled due to [place].     Mood: Stable, pt continues to comply with Lexapro 20 mg.     Urinary Incontinence: Stable, complying with Detrol LA 4 mg. Pt reports no urinary flares. Indigestion: Pt denies increased indigestion, but confirms some flatulence due to diet. Pt expresses concern about tooth infection after knee surgery and reports that her dentist does not expect issues. Pt reports brown spots on her arms. Pt does not have any other acute concerns today. Review of Systems   Musculoskeletal: Positive for joint pain (Bilateral knee pain R>L). All other systems reviewed and are negative. Physical Exam  Constitutional:       Appearance: Normal appearance. HENT:      Right Ear: Hearing, tympanic membrane and external ear normal.      Left Ear: Hearing, tympanic membrane and external ear normal.      Mouth/Throat:      Mouth: Mucous membranes are moist.      Pharynx: Oropharynx is clear. Cardiovascular:      Rate and Rhythm: Normal rate and regular rhythm. Pulmonary:      Effort: Pulmonary effort is normal.      Breath sounds: Normal breath sounds and air entry. No wheezing. Musculoskeletal: Normal range of motion. General: No swelling (knee). Skin:     General: Skin is warm and dry. Neurological:      General: No focal deficit present. Mental Status: She is alert and oriented to person, place, and time. Psychiatric:         Mood and Affect: Mood normal.         Behavior: Behavior normal.         ASSESSMENT and PLAN  Diagnoses and all orders for this visit:    1. Medicare annual wellness visit, subsequent    2. Prediabetes  Sugars stable. Continue to follow diabetic diet and monitor sugars. Recommend that pt continue with Medi Weight loss since other pt have seen results. Informed pt that after [facility] Weight loss that she could shift over to Weight Watchers to maintain her weight loss. Reassured pt that her blood work shows that she is not a diabetic, and her levels are normal. Informed pt that weight loss would help with her mobility.      3. Acquired hypothyroidism  Thyroid stable. I do not recommend a change in medications. Informed pt that she needs to take her thyroid medication separately from [employer] Weight [employer] supplements due to possible decreased absorption.   -     levothyroxine (SYNTHROID) 175 mcg tablet; Take 1 Tab by mouth Daily (before breakfast). 4. Chronic pain of both knees  Not stable. Informed pt that she will need knee surgery since weight loss will not provide relief for moderate-severe osteoarthritis. Informed pt that weight loss will improve her recovery for surgery. 5. Other urinary incontinence  Stable and well-managed Detrol LA 4 mg. No change in medications. -     tolterodine ER (DETROL LA) 4 mg ER capsule; Take 1 Cap by mouth daily. 6. Depression  Stable and well-managed with Lexapro 20 mg. No change in medications. -     escitalopram oxalate (LEXAPRO) 20 mg tablet; Take 1 Tab by mouth daily. 7. Mixed hyperlipidemia  Stable, patient is tolerating medications, no myalgias. I do not recommend any change in medications. -     atorvastatin (LIPITOR) 10 mg tablet; Take 1 Tab by mouth daily. Additional Comments: Recommended that pt reschedule her sleep test since weight can be related to adequate sleep. Informed pt that  if she is having some components of sleep apnea then it can affect her weight and metabolism. Lab results and schedule of future lab studies reviewed with patient. Reviewed diet, exercise and weight control. Written by Mirlande Campo, as dictated by Shikha Marcial MD.     Current diagnosis and concerns discussed with pt at length. Understands risks and benefits or current treatment plan and medications and accepts the treatment and medication with any possible risks. Pt asks appropriate questions which were answered. Pt instructed to call with any concerns or problems.

## 2020-05-07 NOTE — PATIENT INSTRUCTIONS

## 2020-10-08 LAB — SARS-COV-2, NAA: NOT DETECTED

## 2020-11-04 DIAGNOSIS — E78.2 MIXED HYPERLIPIDEMIA: ICD-10-CM

## 2020-11-04 DIAGNOSIS — E03.9 ACQUIRED HYPOTHYROIDISM: ICD-10-CM

## 2020-11-04 RX ORDER — LEVOTHYROXINE SODIUM 175 UG/1
TABLET ORAL
Qty: 90 TAB | Refills: 0 | Status: SHIPPED | OUTPATIENT
Start: 2020-11-04 | End: 2021-03-23 | Stop reason: SDUPTHER

## 2020-11-04 RX ORDER — ATORVASTATIN CALCIUM 10 MG/1
TABLET, FILM COATED ORAL
Qty: 90 TAB | Refills: 0 | Status: SHIPPED | OUTPATIENT
Start: 2020-11-04 | End: 2021-03-23 | Stop reason: SDUPTHER

## 2020-11-10 ENCOUNTER — OFFICE VISIT (OUTPATIENT)
Dept: INTERNAL MEDICINE CLINIC | Age: 75
End: 2020-11-10
Payer: MEDICARE

## 2020-11-10 VITALS
BODY MASS INDEX: 31.96 KG/M2 | TEMPERATURE: 98.4 F | RESPIRATION RATE: 18 BRPM | OXYGEN SATURATION: 96 % | SYSTOLIC BLOOD PRESSURE: 126 MMHG | WEIGHT: 191.8 LBS | DIASTOLIC BLOOD PRESSURE: 65 MMHG | HEIGHT: 65 IN | HEART RATE: 71 BPM

## 2020-11-10 DIAGNOSIS — G89.29 CHRONIC PAIN OF BOTH KNEES: ICD-10-CM

## 2020-11-10 DIAGNOSIS — R73.03 PREDIABETES: ICD-10-CM

## 2020-11-10 DIAGNOSIS — M25.561 CHRONIC PAIN OF BOTH KNEES: ICD-10-CM

## 2020-11-10 DIAGNOSIS — F34.1 DYSTHYMIA: ICD-10-CM

## 2020-11-10 DIAGNOSIS — E78.5 HYPERLIPIDEMIA LDL GOAL <130: ICD-10-CM

## 2020-11-10 DIAGNOSIS — E03.9 ACQUIRED HYPOTHYROIDISM: ICD-10-CM

## 2020-11-10 DIAGNOSIS — N39.498 OTHER URINARY INCONTINENCE: ICD-10-CM

## 2020-11-10 DIAGNOSIS — M25.562 CHRONIC PAIN OF BOTH KNEES: ICD-10-CM

## 2020-11-10 DIAGNOSIS — E78.2 MIXED HYPERLIPIDEMIA: Primary | ICD-10-CM

## 2020-11-10 PROBLEM — E66.01 SEVERE OBESITY (BMI 35.0-39.9): Status: RESOLVED | Noted: 2018-06-26 | Resolved: 2020-11-10

## 2020-11-10 LAB
T4 FREE SERPL-MCNC: 1.5 NG/DL (ref 0.8–1.5)
TSH SERPL DL<=0.05 MIU/L-ACNC: 0.75 UIU/ML (ref 0.36–3.74)

## 2020-11-10 PROCEDURE — 3288F FALL RISK ASSESSMENT DOCD: CPT | Performed by: INTERNAL MEDICINE

## 2020-11-10 PROCEDURE — G8399 PT W/DXA RESULTS DOCUMENT: HCPCS | Performed by: INTERNAL MEDICINE

## 2020-11-10 PROCEDURE — 1100F PTFALLS ASSESS-DOCD GE2>/YR: CPT | Performed by: INTERNAL MEDICINE

## 2020-11-10 PROCEDURE — G9899 SCRN MAM PERF RSLTS DOC: HCPCS | Performed by: INTERNAL MEDICINE

## 2020-11-10 PROCEDURE — 99214 OFFICE O/P EST MOD 30 MIN: CPT | Performed by: INTERNAL MEDICINE

## 2020-11-10 PROCEDURE — 1090F PRES/ABSN URINE INCON ASSESS: CPT | Performed by: INTERNAL MEDICINE

## 2020-11-10 PROCEDURE — G8417 CALC BMI ABV UP PARAM F/U: HCPCS | Performed by: INTERNAL MEDICINE

## 2020-11-10 PROCEDURE — G0463 HOSPITAL OUTPT CLINIC VISIT: HCPCS | Performed by: INTERNAL MEDICINE

## 2020-11-10 PROCEDURE — G8427 DOCREV CUR MEDS BY ELIG CLIN: HCPCS | Performed by: INTERNAL MEDICINE

## 2020-11-10 PROCEDURE — G9717 DOC PT DX DEP/BP F/U NT REQ: HCPCS | Performed by: INTERNAL MEDICINE

## 2020-11-10 PROCEDURE — 3017F COLORECTAL CA SCREEN DOC REV: CPT | Performed by: INTERNAL MEDICINE

## 2020-11-10 PROCEDURE — G8536 NO DOC ELDER MAL SCRN: HCPCS | Performed by: INTERNAL MEDICINE

## 2020-11-10 RX ORDER — ZINC GLUCONATE 10 MG
LOZENGE ORAL
COMMUNITY
End: 2021-05-13

## 2020-11-10 RX ORDER — CALCIUM CARBONATE/VITAMIN D3 600 MG-125
TABLET ORAL
COMMUNITY

## 2020-11-10 RX ORDER — OMEGA-3-ACID ETHYL ESTERS 1 G/1
2 CAPSULE, LIQUID FILLED ORAL 2 TIMES DAILY
COMMUNITY
End: 2021-05-13

## 2020-11-10 RX ORDER — BISMUTH SUBSALICYLATE 262 MG
1 TABLET,CHEWABLE ORAL DAILY
COMMUNITY
End: 2022-03-23 | Stop reason: ALTCHOICE

## 2020-11-10 NOTE — PROGRESS NOTES
HISTORY OF PRESENT ILLNESS  Michael Boggs is a 70 y.o. female. HPI  Hyperlipidemia:  Cardiovascular risk analysis - 70 y.o. female LDL goal is under 130. ROS: taking medications as instructed, no medication side effects noted, no TIA's, no chest pain on exertion, no dyspnea on exertion, no swelling of ankles. Tolerating meds, no myalgias or other side effects noted. New concerns: Pt's last LDL was 133 on 4/29/20. Pt reports that she is exercising with her new puppy. hypothyroidism. Lab Results   Component Value Date/Time    TSH 1.04 04/29/2020 10:30 AM     Thyroid ROS: denies fatigue, weight changes, heat/cold intolerance, bowel/skin changes or CVS symptoms. Prediabetes: Pt reports that she is continuing to lose weight through [facility]. She states that she has seen good results and overall feels better. Mood: Stable, pt continues to comply with Lexapro. Chronic pain in both knees: Stable with some improvement upon weight loss. Urinary incontinence: Stable, pt continues to comply with Detrol LA. Health maintenance: Pt reports that she has colonoscopy scheduled. Review of Systems   All other systems reviewed and are negative. Physical Exam  Constitutional:       Appearance: Normal appearance. HENT:      Right Ear: Hearing, tympanic membrane and external ear normal.      Left Ear: Hearing, tympanic membrane and external ear normal.      Mouth/Throat:      Mouth: Mucous membranes are moist.      Pharynx: Oropharynx is clear. Cardiovascular:      Rate and Rhythm: Normal rate and regular rhythm. Pulses: Normal pulses. Heart sounds: Normal heart sounds. Pulmonary:      Effort: Pulmonary effort is normal.      Breath sounds: Normal breath sounds and air entry. Musculoskeletal: Normal range of motion. Skin:     General: Skin is warm and dry. Neurological:      General: No focal deficit present.       Mental Status: She is alert and oriented to person, place, and time. Psychiatric:         Mood and Affect: Mood normal.         Behavior: Behavior normal.         ASSESSMENT and PLAN  Diagnoses and all orders for this visit:    1. Mixed hyperlipidemia  Stable, patient is tolerating medications, no myalgias. I do not recommend any change in medications. 2. Acquired hypothyroidism  Thyroid presumed stable. Rechecking labs to confirm. Pt tolerating medication. I do not recommend a change in treatment plan. -     T4, FREE; Future  -     TSH 3RD GENERATION; Future    3. Prediabetes  Sugars stable. Continue to follow diabetic diet and monitor sugars. Discussed with pt that I am very happy with her weight loss. 4. Hyperlipidemia LDL goal <130    5. Dysthymia  Stable and well-managed. No change in medications. 6. Chronic pain of both knees  Stable and well-managed. No change in management plan. 7. Other urinary incontinence  Stable and well-managed. No change in medications. Lab results and schedule of future lab studies reviewed with patient. Reviewed diet, exercise and weight control. Written by Edmond Nelson, as dictated by Yari Sun MD.     Current diagnosis and concerns discussed with pt at length. Understands risks and benefits or current treatment plan and medications and accepts the treatment and medication with any possible risks. Pt asks appropriate questions which were answered. Pt instructed to call with any concerns or problems.

## 2020-11-11 LAB — HEMOGLOBIN A1C: 5.4 %

## 2020-12-08 NOTE — PERIOP NOTES
Left detailed message on answering machine regarding need for covid swab four days prior to scheduled procedure including date of covid swab, 8am-11am time frame, and location.

## 2020-12-12 ENCOUNTER — TRANSCRIBE ORDER (OUTPATIENT)
Dept: REGISTRATION | Age: 75
End: 2020-12-12

## 2020-12-12 ENCOUNTER — HOSPITAL ENCOUNTER (OUTPATIENT)
Dept: LAB | Age: 75
Discharge: HOME | End: 2020-12-12
Payer: MEDICARE

## 2020-12-12 DIAGNOSIS — U07.1 COVID-19: ICD-10-CM

## 2020-12-12 DIAGNOSIS — U07.1 COVID-19: Primary | ICD-10-CM

## 2020-12-12 PROCEDURE — 87635 SARS-COV-2 COVID-19 AMP PRB: CPT

## 2020-12-13 LAB — SARS-COV-2, COV2NT: NOT DETECTED

## 2020-12-16 ENCOUNTER — HOSPITAL ENCOUNTER (OUTPATIENT)
Age: 75
Setting detail: OUTPATIENT SURGERY
Discharge: HOME | End: 2020-12-16
Attending: INTERNAL MEDICINE | Admitting: INTERNAL MEDICINE
Payer: MEDICARE

## 2020-12-16 ENCOUNTER — ANESTHESIA (OUTPATIENT)
Dept: ENDOSCOPY | Age: 75
End: 2020-12-16
Payer: MEDICARE

## 2020-12-16 ENCOUNTER — ANESTHESIA EVENT (OUTPATIENT)
Dept: ENDOSCOPY | Age: 75
End: 2020-12-16
Payer: MEDICARE

## 2020-12-16 VITALS
HEIGHT: 65 IN | BODY MASS INDEX: 31.59 KG/M2 | DIASTOLIC BLOOD PRESSURE: 65 MMHG | HEART RATE: 58 BPM | TEMPERATURE: 97.7 F | RESPIRATION RATE: 16 BRPM | OXYGEN SATURATION: 100 % | WEIGHT: 189.6 LBS | SYSTOLIC BLOOD PRESSURE: 128 MMHG

## 2020-12-16 PROCEDURE — 74011250637 HC RX REV CODE- 250/637: Performed by: INTERNAL MEDICINE

## 2020-12-16 PROCEDURE — 77030013992 HC SNR POLYP ENDOSC BSC -B: Performed by: INTERNAL MEDICINE

## 2020-12-16 PROCEDURE — 88305 TISSUE EXAM BY PATHOLOGIST: CPT

## 2020-12-16 PROCEDURE — 74011250636 HC RX REV CODE- 250/636: Performed by: NURSE ANESTHETIST, CERTIFIED REGISTERED

## 2020-12-16 PROCEDURE — 76040000019: Performed by: INTERNAL MEDICINE

## 2020-12-16 PROCEDURE — 76060000031 HC ANESTHESIA FIRST 0.5 HR: Performed by: INTERNAL MEDICINE

## 2020-12-16 PROCEDURE — 2709999900 HC NON-CHARGEABLE SUPPLY: Performed by: INTERNAL MEDICINE

## 2020-12-16 RX ORDER — FLUMAZENIL 0.1 MG/ML
0.2 INJECTION INTRAVENOUS
Status: DISCONTINUED | OUTPATIENT
Start: 2020-12-16 | End: 2020-12-16 | Stop reason: HOSPADM

## 2020-12-16 RX ORDER — SODIUM CHLORIDE 9 MG/ML
50 INJECTION, SOLUTION INTRAVENOUS CONTINUOUS
Status: DISCONTINUED | OUTPATIENT
Start: 2020-12-16 | End: 2020-12-16 | Stop reason: HOSPADM

## 2020-12-16 RX ORDER — PROPOFOL 10 MG/ML
INJECTION, EMULSION INTRAVENOUS
Status: DISCONTINUED | OUTPATIENT
Start: 2020-12-16 | End: 2020-12-16 | Stop reason: HOSPADM

## 2020-12-16 RX ORDER — SODIUM CHLORIDE 9 MG/ML
INJECTION, SOLUTION INTRAVENOUS
Status: DISCONTINUED | OUTPATIENT
Start: 2020-12-16 | End: 2020-12-16 | Stop reason: HOSPADM

## 2020-12-16 RX ORDER — DEXTROMETHORPHAN/PSEUDOEPHED 2.5-7.5/.8
1.2 DROPS ORAL
Status: DISCONTINUED | OUTPATIENT
Start: 2020-12-16 | End: 2020-12-16 | Stop reason: HOSPADM

## 2020-12-16 RX ORDER — ATROPINE SULFATE 0.1 MG/ML
0.5 INJECTION INTRAVENOUS
Status: DISCONTINUED | OUTPATIENT
Start: 2020-12-16 | End: 2020-12-16 | Stop reason: HOSPADM

## 2020-12-16 RX ORDER — EPINEPHRINE 0.1 MG/ML
1 INJECTION INTRACARDIAC; INTRAVENOUS
Status: DISCONTINUED | OUTPATIENT
Start: 2020-12-16 | End: 2020-12-16 | Stop reason: HOSPADM

## 2020-12-16 RX ORDER — NALOXONE HYDROCHLORIDE 0.4 MG/ML
0.4 INJECTION, SOLUTION INTRAMUSCULAR; INTRAVENOUS; SUBCUTANEOUS
Status: DISCONTINUED | OUTPATIENT
Start: 2020-12-16 | End: 2020-12-16 | Stop reason: HOSPADM

## 2020-12-16 RX ORDER — PROPOFOL 10 MG/ML
INJECTION, EMULSION INTRAVENOUS AS NEEDED
Status: DISCONTINUED | OUTPATIENT
Start: 2020-12-16 | End: 2020-12-16 | Stop reason: HOSPADM

## 2020-12-16 RX ORDER — MIDAZOLAM HYDROCHLORIDE 1 MG/ML
.25-5 INJECTION, SOLUTION INTRAMUSCULAR; INTRAVENOUS
Status: DISCONTINUED | OUTPATIENT
Start: 2020-12-16 | End: 2020-12-16 | Stop reason: HOSPADM

## 2020-12-16 RX ADMIN — PROPOFOL 50 MG: 10 INJECTION, EMULSION INTRAVENOUS at 10:20

## 2020-12-16 RX ADMIN — SODIUM CHLORIDE: 9 INJECTION, SOLUTION INTRAVENOUS at 10:17

## 2020-12-16 RX ADMIN — PROPOFOL 150 MCG/KG/MIN: 10 INJECTION, EMULSION INTRAVENOUS at 10:20

## 2020-12-16 NOTE — H&P
[address]  [address] [facility], [address] [facility]  [phone]                     History and Physical     NAME: Elvira Leo   :  1945   MRN:  [id number]     HPI:  Pt has a personal hx of colon polyps. Pt is due for surveillance. Past Surgical History:   Procedure Laterality Date    HX  SECTION      HX CYST INCISION AND DRAINAGE Left     neg    HX HEENT      thyroid    THYROIDECTOMY      UPPER GI ENDOSCOPY,BIOPSY  2018          Past Medical History:   Diagnosis Date    Cancer [facility])     thyroid    Chronic obstructive pulmonary disease (HCC)     mild    Depression     Hyperlipidemia     Unspecified hypothyroidism 2009    thyroid cancer     Social History     Tobacco Use    Smoking status: Never Smoker    Smokeless tobacco: Never Used   Substance Use Topics    Alcohol use: Yes     Alcohol/week: 7.0 standard drinks     Types: 7 Glasses of wine per week    Drug use: No     Allergies   Allergen Reactions    Zetia [Ezetimibe] Rash     Family History   Problem Relation Age of Onset    Cancer Mother     Arthritis-osteo Mother     Breast Cancer Mother [de-identified]    Cancer Sister     Thyroid Disease Sister     Cancer Brother     Heart Failure Maternal Grandmother     Heart Failure Maternal Grandfather     Heart Failure Father      No current facility-administered medications for this encounter. Current Outpatient Medications   Medication Sig    omega-3 acid ethyl esters (LOVAZA) 1 gram capsule Take 2 g by mouth two (2) times a day. Medi weight loss supplement    calcium-cholecalciferol, d3, (CALCIUM 600 + D) 600-125 mg-unit tab Take  by mouth. Medi Weightloss supplement    OTHER Medi Weightloss fat burner supplement    multivitamin (ONE A DAY) tablet Take 1 Tab by mouth daily. Medi Weightloss supplement    magnesium 250 mg tab Take  by mouth.  Medi weightloss supplement    atorvastatin (LIPITOR) 10 mg tablet TAKE ONE TABLET BY MOUTH DAILY    levothyroxine (SYNTHROID) 175 mcg tablet TAKE ONE TABLET BY MOUTH DAILY BEFORE BREAKFAST    tolterodine ER (DETROL LA) 4 mg ER capsule Take 1 Cap by mouth daily.  escitalopram oxalate (LEXAPRO) 20 mg tablet Take 1 Tab by mouth daily.  fluticasone-salmeterol (ADVAIR) 250-50 mcg/dose diskus inhaler Take 1 Puff by inhalation two (2) times a day for 90 days.  tolterodine ER (DETROL LA) 4 mg ER capsule Take 1 Cap by mouth daily for 90 days.  naproxen sodium (ALEVE) 220 mg tablet Take 220 mg by mouth two (2) times daily (with meals).  tolterodine ER (DETROL LA) 4 mg ER capsule Take 1 capsule by mouth daily for 30 days. PHYSICAL EXAM:  General: WD, WN. Alert, cooperative, no acute distress    HEENT: NC, Atraumatic. PERRLA, EOMI. Anicteric sclerae. Lungs:  CTA Bilaterally. No Wheezing/Rhonchi/Rales. Heart:  Regular  rhythm,  No murmur, No Rubs, No Gallops  Abdomen: Soft, Non distended, Non tender.  +Bowel sounds, no HSM  Extremities: No c/c/e  Neurologic:  CN 2-12 gi, Alert and oriented X 3. No acute neurological distress   Psych:   Good insight. Not anxious nor agitated. Assessment:   I have reviewed with the patient +/- family alternatives,benefits and risks for the procedure, as well as potential complications(with emphasis on, but not limited to, bleeding, perforation, cardiovascular/cerebrovascular/pulmonary events, reactions to the medications, infection, risk of missing a lesions/a cancer, and the imponderables including death), alternative options, and patient/family voices understanding.       Plan:   · Endoscopic procedure  · Conscious sedation or MAC

## 2020-12-16 NOTE — PROCEDURES
[address] [facility]  [address], [address] [facility]  [phone]                   Colonoscopy Operative Report      Indications:    Personal history of colon polyps (screening only)     :  Tres Linder MD    Assistants: None    Referring Provider: Ilan Call MD    Sedation:  MAC anesthesia Propofol    Procedure Details:  After informed consent was obtained with all risks and benefits of procedure explained and preoperative exam completed, the patient was taken to the endoscopy suite and placed in the left lateral decubitus position. Upon sequential sedation as per above, a digital rectal exam was performed  And was normal.  The Olympus videocolonoscope  was inserted in the rectum and carefully advanced to the cecum, which was identified by the ileocecal valve and appendiceal orifice, terminal ileum. The quality of preparation was excellent. The colonoscope was slowly withdrawn with careful evaluation between folds. Retroflexion in the rectum was performed and was normal..     Findings:   Rectum: Grade 1 internal hemorrhoid(s); Sigmoid: diverticulosis  Descending Colon:     - Diverticulosis  Transverse Colon: 1  Sessile polyp(s), the largest 3 mm in size;  Ascending Colon: normal  Cecum: normal  Terminal Ileum: normal    Interventions:  1 complete polypectomy were performed using cold biopsy forceps and the polyps were  retrieved    Specimen Removed:  specimen #1, 4 mm in size, located in the transverse colon removed by cold biopsy and sent for pathology    Implants: none    Complications: None. EBL:  None. Impression: Diverticulosis                        Small ( < 5 mm) transverse colon polyp                        Internal hemorrhorids    Recommendations:   -Await pathology.   -Repeat colonoscopy in 5 years.  -High fiber diet.     -Resume normal medication(s)  -Call office for pathology results in 1 week        Discharge Disposition:  Home in the company of a  when able to ambulate.     Marlen Shoemaker MD  12/16/2020  10:36 AM

## 2020-12-16 NOTE — PROGRESS NOTES
Donal Nowak  1945  [id number]    Situation:  [facility]   Procedure: Procedure(s):  COLONOSCOPY  POLYPECTOMY    Background:    Preoperative diagnosis: PERSONALHISTORY OF POLYPS  Postoperative diagnosis: 1. transverse colon polyp  2. diverticulosis  3. hemorrhoids    :  Dr. Claire Love  Assistant(s): Endoscopy Technician-1: Judie Mayo  Endoscopy RN-1: Jony Hull    Specimens:   ID Type Source Tests Collected by Time Destination   1 : Polyp Preservative Colon, Transverse  Razia Ivey MD 12/16/2020 1030 Pathology     H. Pylori  no    Assessment:    Anesthesia gave intra-procedure sedation and medications, see anesthesia flow sheet no    Intravenous fluids: NS@ KVO     Vital signs stable     Abdominal assessment: round and soft     Recommendation:  Discharge patient per MD order.   Return to floor  Family or Friend   Permission to share finding with family or friend yes

## 2020-12-16 NOTE — ANESTHESIA POSTPROCEDURE EVALUATION
Procedure(s):  COLONOSCOPY  POLYPECTOMY. MAC    Anesthesia Post Evaluation      Multimodal analgesia: multimodal analgesia not used between 6 hours prior to anesthesia start to PACU discharge  Patient location during evaluation: PACU  Patient participation: complete - patient participated  Level of consciousness: awake and alert  Pain score: 0  Pain management: adequate  Airway patency: patent  Anesthetic complications: no  Cardiovascular status: hemodynamically stable and acceptable  Respiratory status: acceptable  Hydration status: acceptable  Comments: Patient seen and evaluated; no concerns. Post anesthesia nausea and vomiting:  none      INITIAL Post-op Vital signs:   Vitals Value Taken Time   /65 12/16/20 1054   Temp     Pulse 56 12/16/20 1059   Resp 12 12/16/20 1059   SpO2 100 % 12/16/20 1059   Vitals shown include unvalidated device data.

## 2020-12-16 NOTE — PROGRESS NOTES
Endoscopy discharge instructions have been reviewed and given to patient. The patient verbalized understanding and acceptance of instructions. Dr. Hetal Nam discussed with patient procedure findings and next steps.

## 2020-12-16 NOTE — ANESTHESIA PREPROCEDURE EVALUATION
Anesthetic History   No history of anesthetic complications            Review of Systems / Medical History  Patient summary reviewed, nursing notes reviewed and pertinent labs reviewed    Pulmonary    COPD: mild               Neuro/Psych         Psychiatric history     Cardiovascular              Hyperlipidemia    Exercise tolerance: >4 METS     GI/Hepatic/Renal     GERD           Endo/Other      Hypothyroidism: well controlled  Arthritis and cancer (h/o thyroid cancer)     Other Findings              Physical Exam    Airway  Mallampati: II  TM Distance: 4 - 6 cm  Neck ROM: normal range of motion   Mouth opening: Normal     Cardiovascular  Regular rate and rhythm,  S1 and S2 normal,  no murmur, click, rub, or gallop  Rhythm: regular  Rate: normal         Dental    Dentition: Implants     Pulmonary  Breath sounds clear to auscultation               Abdominal  GI exam deferred       Other Findings            Anesthetic Plan    ASA: 2  Anesthesia type: MAC          Induction: Intravenous  Anesthetic plan and risks discussed with: Patient      Informed consent obtained.

## 2020-12-16 NOTE — PROGRESS NOTES
Anesthesia staff at patient's bedside administering anesthesia and monitoring patients vital signs throughout procedure. See anesthesia note. ABD remains soft and non-tender post procedure. Pt has no complaints at this time and tolerated the procedure well. Endoscope was pre-cleaned at bedside immediately following procedure by Yesenia.

## 2020-12-16 NOTE — DISCHARGE INSTRUCTIONS
[address] [facility] [address]  [address] [facility], [address] [place]  [phone]                   Ivelisse Gonzalez  [id number]  1945    COLON DISCHARGE INSTRUCTIONS    DISCOMFORT:  Redness at IV site- apply warm compress to area; if redness or soreness persist- contact your physician  There may be a slight amount of blood passed from the rectum  Gaseous discomfort- walking, belching will help relieve any discomfort  You may not operate a vehicle for 12 hours  You may not  engage in an occupation involving machinery or appliances for rest of today  You may not  drink alcoholic beverages for at least 12 hours  Avoid making any critical decisions for at least 24 hour    DIET:   High fiber diet. - however -  remember your colon is empty and a heavy meal will produce gas. Avoid these foods:  vegetables, fried / greasy foods, carbonated drinks for today     ACTIVITY:  It is recommended that you spend the remainder of the day resting -  avoid any strenuous activity. CALL M.D. ANY SIGN OF:   Increasing pain, nausea, vomiting  Abdominal distension (swelling)  New increased bleeding (oral or rectal)  Fever (chills)  Pain in chest area  Bloody discharge from nose or mouth  Shortness of breath    You may resume medications    Post procedure diagnosis:  1. transverse colon polyp  2. diverticulosis  3. hemorrhoids    Follow-up Instructions:    If a specimen was collected, you will receive a letter with the result by mail within two  weeks. Depending on the result this letter will specify your follow up colonoscopy date. Please call us for any questions or concerns                     Ivelisse Gonzalez  [id number]  1945        DISCHARGE SUMMARY from Nurse    The following personal items collected during your admission are returned to you:   Dental Appliance: Dental Appliances: None  Vision: Visual Aid:  At bedside, Glasses  Hearing Aid:    Jewelry:    Clothing:    Other Valuables: Valuables sent to safe:

## 2021-02-11 ENCOUNTER — TRANSCRIBE ORDER (OUTPATIENT)
Dept: SCHEDULING | Age: 76
End: 2021-02-11

## 2021-02-11 DIAGNOSIS — Z12.31 VISIT FOR SCREENING MAMMOGRAM: Primary | ICD-10-CM

## 2021-02-23 ENCOUNTER — HOSPITAL ENCOUNTER (OUTPATIENT)
Dept: MAMMOGRAPHY | Age: 76
Discharge: HOME | End: 2021-02-23
Attending: INTERNAL MEDICINE
Payer: MEDICARE

## 2021-02-23 DIAGNOSIS — Z12.31 VISIT FOR SCREENING MAMMOGRAM: ICD-10-CM

## 2021-02-23 PROCEDURE — 77067 SCR MAMMO BI INCL CAD: CPT

## 2021-03-17 ENCOUNTER — TELEPHONE (OUTPATIENT)
Dept: INTERNAL MEDICINE CLINIC | Age: 76
End: 2021-03-17

## 2021-03-17 DIAGNOSIS — E03.9 ACQUIRED HYPOTHYROIDISM: ICD-10-CM

## 2021-03-17 DIAGNOSIS — R73.03 PREDIABETES: Primary | ICD-10-CM

## 2021-03-17 DIAGNOSIS — E78.5 DYSLIPIDEMIA: ICD-10-CM

## 2021-03-17 NOTE — TELEPHONE ENCOUNTER
----- Message from Daniel Michael sent at 3/17/2021  1:34 PM EDT -----  Regarding: Say Thomson/Telephone  Contact: [phone]  General Message/Vendor Calls    Caller's first and last name: Pt.       Reason for call: Pt would like to get labs before appointment, sent message via [employer] to get lab order. Callback required yes/no and why: Yes, confirm lab order. Best contact number(s): [phone]      Details to clarify the request: May email lab order if possible, or Pt can pick it up. Email: [email or web address]. May leave voicemail if no answer.        Daniel Michael

## 2021-03-18 NOTE — TELEPHONE ENCOUNTER
Spoke with patient and she requested the lab slip be faxed to the Brian Quintero at [facility].

## 2021-03-19 ENCOUNTER — HOSPITAL ENCOUNTER (OUTPATIENT)
Dept: LAB | Age: 76
Discharge: HOME | End: 2021-03-19
Payer: MEDICARE

## 2021-03-19 PROCEDURE — 83036 HEMOGLOBIN GLYCOSYLATED A1C: CPT

## 2021-03-19 PROCEDURE — 36415 COLL VENOUS BLD VENIPUNCTURE: CPT

## 2021-03-19 PROCEDURE — 80061 LIPID PANEL: CPT

## 2021-03-19 PROCEDURE — 80053 COMPREHEN METABOLIC PANEL: CPT

## 2021-03-19 PROCEDURE — 84439 ASSAY OF FREE THYROXINE: CPT

## 2021-03-19 PROCEDURE — 84443 ASSAY THYROID STIM HORMONE: CPT

## 2021-03-19 PROCEDURE — 85027 COMPLETE CBC AUTOMATED: CPT

## 2021-03-20 LAB
ALBUMIN SERPL-MCNC: 4.4 G/DL (ref 3.7–4.7)
ALBUMIN/GLOB SERPL: 1.8 {RATIO} (ref 1.2–2.2)
ALP SERPL-CCNC: 98 IU/L (ref 39–117)
ALT SERPL-CCNC: 17 IU/L (ref 0–32)
AST SERPL-CCNC: 22 IU/L (ref 0–40)
BILIRUB SERPL-MCNC: 0.7 MG/DL (ref 0–1.2)
BUN SERPL-MCNC: 17 MG/DL (ref 8–27)
BUN/CREAT SERPL: 19 (ref 12–28)
CALCIUM SERPL-MCNC: 9.7 MG/DL (ref 8.7–10.3)
CHLORIDE SERPL-SCNC: 103 MMOL/L (ref 96–106)
CHOLEST SERPL-MCNC: 196 MG/DL (ref 100–199)
CO2 SERPL-SCNC: 26 MMOL/L (ref 20–29)
CREAT SERPL-MCNC: 0.89 MG/DL (ref 0.57–1)
ERYTHROCYTE [DISTWIDTH] IN BLOOD BY AUTOMATED COUNT: 12 % (ref 11.7–15.4)
EST. AVERAGE GLUCOSE BLD GHB EST-MCNC: 108 MG/DL
GLOBULIN SER CALC-MCNC: 2.5 G/DL (ref 1.5–4.5)
GLUCOSE SERPL-MCNC: 95 MG/DL (ref 65–99)
HBA1C MFR BLD: 5.4 % (ref 4.8–5.6)
HCT VFR BLD AUTO: 42.6 % (ref 34–46.6)
HDLC SERPL-MCNC: 68 MG/DL
HGB BLD-MCNC: 14 G/DL (ref 11.1–15.9)
IMP & REVIEW OF LAB RESULTS: NORMAL
LDLC SERPL CALC-MCNC: 112 MG/DL (ref 0–99)
MCH RBC QN AUTO: 29.5 PG (ref 26.6–33)
MCHC RBC AUTO-ENTMCNC: 32.9 G/DL (ref 31.5–35.7)
MCV RBC AUTO: 90 FL (ref 79–97)
PLATELET # BLD AUTO: 246 X10E3/UL (ref 150–450)
POTASSIUM SERPL-SCNC: 4.8 MMOL/L (ref 3.5–5.2)
PROT SERPL-MCNC: 6.9 G/DL (ref 6–8.5)
RBC # BLD AUTO: 4.74 X10E6/UL (ref 3.77–5.28)
SODIUM SERPL-SCNC: 142 MMOL/L (ref 134–144)
T4 FREE SERPL-MCNC: 2.03 NG/DL (ref 0.82–1.77)
TRIGL SERPL-MCNC: 87 MG/DL (ref 0–149)
TSH SERPL DL<=0.005 MIU/L-ACNC: 0.66 UIU/ML (ref 0.45–4.5)
VLDLC SERPL CALC-MCNC: 16 MG/DL (ref 5–40)
WBC # BLD AUTO: 7.4 X10E3/UL (ref 3.4–10.8)

## 2021-03-23 ENCOUNTER — OFFICE VISIT (OUTPATIENT)
Dept: INTERNAL MEDICINE CLINIC | Age: 76
End: 2021-03-23
Payer: MEDICARE

## 2021-03-23 VITALS
RESPIRATION RATE: 18 BRPM | OXYGEN SATURATION: 96 % | BODY MASS INDEX: 32.02 KG/M2 | WEIGHT: 192.2 LBS | SYSTOLIC BLOOD PRESSURE: 130 MMHG | TEMPERATURE: 98.2 F | DIASTOLIC BLOOD PRESSURE: 75 MMHG | HEART RATE: 69 BPM | HEIGHT: 65 IN

## 2021-03-23 DIAGNOSIS — E03.9 ACQUIRED HYPOTHYROIDISM: ICD-10-CM

## 2021-03-23 DIAGNOSIS — F32.A DEPRESSION: ICD-10-CM

## 2021-03-23 DIAGNOSIS — E78.2 MIXED HYPERLIPIDEMIA: ICD-10-CM

## 2021-03-23 DIAGNOSIS — N39.498 OTHER URINARY INCONTINENCE: ICD-10-CM

## 2021-03-23 DIAGNOSIS — J45.20 RAD (REACTIVE AIRWAY DISEASE) WITH WHEEZING, MILD INTERMITTENT, UNCOMPLICATED: Primary | ICD-10-CM

## 2021-03-23 PROCEDURE — G8536 NO DOC ELDER MAL SCRN: HCPCS | Performed by: INTERNAL MEDICINE

## 2021-03-23 PROCEDURE — G8399 PT W/DXA RESULTS DOCUMENT: HCPCS | Performed by: INTERNAL MEDICINE

## 2021-03-23 PROCEDURE — G0463 HOSPITAL OUTPT CLINIC VISIT: HCPCS | Performed by: INTERNAL MEDICINE

## 2021-03-23 PROCEDURE — G9717 DOC PT DX DEP/BP F/U NT REQ: HCPCS | Performed by: INTERNAL MEDICINE

## 2021-03-23 PROCEDURE — 3017F COLORECTAL CA SCREEN DOC REV: CPT | Performed by: INTERNAL MEDICINE

## 2021-03-23 PROCEDURE — 3288F FALL RISK ASSESSMENT DOCD: CPT | Performed by: INTERNAL MEDICINE

## 2021-03-23 PROCEDURE — 1090F PRES/ABSN URINE INCON ASSESS: CPT | Performed by: INTERNAL MEDICINE

## 2021-03-23 PROCEDURE — G9899 SCRN MAM PERF RSLTS DOC: HCPCS | Performed by: INTERNAL MEDICINE

## 2021-03-23 PROCEDURE — 99214 OFFICE O/P EST MOD 30 MIN: CPT | Performed by: INTERNAL MEDICINE

## 2021-03-23 PROCEDURE — G8427 DOCREV CUR MEDS BY ELIG CLIN: HCPCS | Performed by: INTERNAL MEDICINE

## 2021-03-23 PROCEDURE — 1100F PTFALLS ASSESS-DOCD GE2>/YR: CPT | Performed by: INTERNAL MEDICINE

## 2021-03-23 PROCEDURE — G8417 CALC BMI ABV UP PARAM F/U: HCPCS | Performed by: INTERNAL MEDICINE

## 2021-03-23 RX ORDER — TOLTERODINE 4 MG/1
4 CAPSULE, EXTENDED RELEASE ORAL DAILY
Qty: 90 CAP | Refills: 3 | Status: SHIPPED | OUTPATIENT
Start: 2021-03-23 | End: 2022-03-23 | Stop reason: SDUPTHER

## 2021-03-23 RX ORDER — LEVOTHYROXINE SODIUM 175 UG/1
TABLET ORAL
Qty: 90 TAB | Refills: 1 | Status: SHIPPED | OUTPATIENT
Start: 2021-03-23 | End: 2021-09-23 | Stop reason: SDUPTHER

## 2021-03-23 RX ORDER — ESCITALOPRAM OXALATE 20 MG/1
TABLET ORAL
Qty: 90 TAB | Refills: 1 | Status: SHIPPED | OUTPATIENT
Start: 2021-03-23 | End: 2021-09-23 | Stop reason: SDUPTHER

## 2021-03-23 RX ORDER — ATORVASTATIN CALCIUM 10 MG/1
TABLET, FILM COATED ORAL
Qty: 90 TAB | Refills: 1 | Status: SHIPPED | OUTPATIENT
Start: 2021-03-23 | End: 2021-09-23 | Stop reason: SDUPTHER

## 2021-03-23 NOTE — PROGRESS NOTES
Severiano Party (: 1945) is a 70 y.o. female, established patient, here for evaluation of the following chief complaint(s): Annual Wellness Visit       ASSESSMENT/PLAN:  1. RAD (reactive airway disease) with wheezing, mild intermittent, uncomplicated- she is off inhaler since th weight loss and doing well  2. Other urinary incontinence- she is taking the medicine- detrol and tolerating it  -     tolterodine ER (DETROL LA) 4 mg ER capsule; Take 1 Cap by mouth daily. , Normal, Disp-90 Cap, R-3  3. Acquired hypothyroidism-stable on -     levothyroxine (SYNTHROID) 175 mcg tablet; TAKE ONE TABLET BY MOUTH DAILY BEFORE BREAKFAST, Normal, Disp-90 Tab, R-1  4. Depression- lexapro is helping at stable pattern  -     escitalopram oxalate (LEXAPRO) 20 mg tablet; TAKE ONE TABLET BY MOUTH DAILY, Normal, Disp-90 Tab, R-1  5. Mixed hyperlipidemia- lipids reviewed with patient and lipitor is good  -     atorvastatin (LIPITOR) 10 mg tablet; TAKE ONE TABLET BY MOUTH DAILY, Normal, Disp-90 Tab, R-1      No follow-ups on file. SUBJECTIVE/OBJECTIVE:  HPI     She is exercising and she got a puppy and is doing well   Her labs were reviewed     Dysthymia- tolerating the lexapro and doing well and feeling well      Subjective:   Severiano Party is a 70 y.o. female with hyperlipidemia. Cardiovascular risk analysis - 70 y.o. female LDL goal is under 130. ROS: taking medications as instructed, no medication side effects noted, no TIA's, no chest pain on exertion, no dyspnea on exertion, no swelling of ankles. Tolerating meds, no myalgias or other side effects noted  New concerns: tolerating medicine   She was on medi weight loss and lost 30 pounds  Urinary  Incontinence and cont with detrol and doing well       SUBJECTIVE: Severiano Party is a 70 y.o. female here for follow up of hypothyroidism.     Lab Results   Component Value Date/Time    TSH 0.662 2021 09:42 AM     Thyroid ROS: denies fatigue, weight changes, heat/cold intolerance, bowel/skin changes or CVS symptoms. .   Asthma- tolerating medicine no acute wheezxe    She has some back pain and has some DJD and goes to chirporactor and her knee pain is better with weight loss     Review of Systems   All other systems reviewed and are negative. Physical Exam  Vitals signs and nursing note reviewed. Constitutional:       Appearance: Normal appearance. She is well-developed. HENT:      Head: Normocephalic and atraumatic. Right Ear: External ear normal.      Left Ear: External ear normal.      Nose: Nose normal.   Eyes:      Conjunctiva/sclera: Conjunctivae normal.      Pupils: Pupils are equal, round, and reactive to light. Neck:      Musculoskeletal: Normal range of motion and neck supple. Thyroid: No thyromegaly. Vascular: No carotid bruit. Cardiovascular:      Rate and Rhythm: Normal rate and regular rhythm. Heart sounds: Normal heart sounds, S1 normal and S2 normal.   Pulmonary:      Effort: Pulmonary effort is normal.      Breath sounds: Normal breath sounds. Abdominal:      General: Bowel sounds are normal.      Palpations: Abdomen is soft. Tenderness: There is no abdominal tenderness. Musculoskeletal: Normal range of motion. Skin:     General: Skin is warm and dry. Neurological:      Mental Status: She is alert and oriented to person, place, and time. Psychiatric:         Behavior: Behavior normal.         Thought Content: Thought content normal.         Judgment: Judgment normal.           On this date 03/23/2021 I have spent 30 minutes reviewing previous notes, test results and face to face with the patient discussing the diagnosis and importance of compliance with the treatment plan as well as documenting on the day of the visit. An electronic signature was used to authenticate this note.   -- Phoenix Huff MD

## 2021-05-13 ENCOUNTER — HOSPITAL ENCOUNTER (OUTPATIENT)
Dept: GENERAL RADIOLOGY | Age: 76
Discharge: HOME | End: 2021-05-13
Attending: INTERNAL MEDICINE
Payer: MEDICARE

## 2021-05-13 ENCOUNTER — OFFICE VISIT (OUTPATIENT)
Dept: INTERNAL MEDICINE CLINIC | Age: 76
End: 2021-05-13
Attending: INTERNAL MEDICINE
Payer: MEDICARE

## 2021-05-13 VITALS
BODY MASS INDEX: 32.65 KG/M2 | TEMPERATURE: 98.4 F | RESPIRATION RATE: 18 BRPM | SYSTOLIC BLOOD PRESSURE: 120 MMHG | HEIGHT: 65 IN | HEART RATE: 71 BPM | DIASTOLIC BLOOD PRESSURE: 70 MMHG | OXYGEN SATURATION: 96 % | WEIGHT: 196 LBS

## 2021-05-13 DIAGNOSIS — B37.9 YEAST INFECTION: ICD-10-CM

## 2021-05-13 DIAGNOSIS — N39.498 OTHER URINARY INCONTINENCE: ICD-10-CM

## 2021-05-13 DIAGNOSIS — G89.29 CHRONIC BILATERAL LOW BACK PAIN WITHOUT SCIATICA: ICD-10-CM

## 2021-05-13 DIAGNOSIS — E78.2 MIXED HYPERLIPIDEMIA: ICD-10-CM

## 2021-05-13 DIAGNOSIS — J45.20 RAD (REACTIVE AIRWAY DISEASE) WITH WHEEZING, MILD INTERMITTENT, UNCOMPLICATED: ICD-10-CM

## 2021-05-13 DIAGNOSIS — F34.1 DYSTHYMIA: ICD-10-CM

## 2021-05-13 DIAGNOSIS — M54.50 CHRONIC BILATERAL LOW BACK PAIN WITHOUT SCIATICA: ICD-10-CM

## 2021-05-13 DIAGNOSIS — Z00.00 MEDICARE ANNUAL WELLNESS VISIT, SUBSEQUENT: Primary | ICD-10-CM

## 2021-05-13 DIAGNOSIS — E03.4 HYPOTHYROIDISM DUE TO ACQUIRED ATROPHY OF THYROID: ICD-10-CM

## 2021-05-13 PROCEDURE — 3288F FALL RISK ASSESSMENT DOCD: CPT | Performed by: INTERNAL MEDICINE

## 2021-05-13 PROCEDURE — G8427 DOCREV CUR MEDS BY ELIG CLIN: HCPCS | Performed by: INTERNAL MEDICINE

## 2021-05-13 PROCEDURE — 72100 X-RAY EXAM L-S SPINE 2/3 VWS: CPT

## 2021-05-13 PROCEDURE — G9717 DOC PT DX DEP/BP F/U NT REQ: HCPCS | Performed by: INTERNAL MEDICINE

## 2021-05-13 PROCEDURE — G8417 CALC BMI ABV UP PARAM F/U: HCPCS | Performed by: INTERNAL MEDICINE

## 2021-05-13 PROCEDURE — 1090F PRES/ABSN URINE INCON ASSESS: CPT | Performed by: INTERNAL MEDICINE

## 2021-05-13 PROCEDURE — G8536 NO DOC ELDER MAL SCRN: HCPCS | Performed by: INTERNAL MEDICINE

## 2021-05-13 PROCEDURE — G8399 PT W/DXA RESULTS DOCUMENT: HCPCS | Performed by: INTERNAL MEDICINE

## 2021-05-13 PROCEDURE — G0439 PPPS, SUBSEQ VISIT: HCPCS | Performed by: INTERNAL MEDICINE

## 2021-05-13 PROCEDURE — 3017F COLORECTAL CA SCREEN DOC REV: CPT | Performed by: INTERNAL MEDICINE

## 2021-05-13 PROCEDURE — G9899 SCRN MAM PERF RSLTS DOC: HCPCS | Performed by: INTERNAL MEDICINE

## 2021-05-13 PROCEDURE — 1100F PTFALLS ASSESS-DOCD GE2>/YR: CPT | Performed by: INTERNAL MEDICINE

## 2021-05-13 PROCEDURE — 99214 OFFICE O/P EST MOD 30 MIN: CPT | Performed by: INTERNAL MEDICINE

## 2021-05-13 RX ORDER — NYSTATIN 100000 [USP'U]/G
POWDER TOPICAL 4 TIMES DAILY
Qty: 60 G | Refills: 3 | Status: SHIPPED | OUTPATIENT
Start: 2021-05-13 | End: 2021-09-23

## 2021-05-13 NOTE — PROGRESS NOTES
Clementine Chu (: 1945) is a 70 y.o. female, established patient, here for evaluation of the following chief complaint(s):  Rash (groin and on chest)       ASSESSMENT/PLAN:  Below is the assessment and plan developed based on review of pertinent history, physical exam, labs, studies, and medications. 1. Medicare annual wellness visit, subsequent  2. Mixed hyperlipidemia  Stable and well-managed. Continue with ongoing regimen of Lipitor. 3. Hypothyroidism due to acquired atrophy of thyroid  Stable and well-managed. Continue with ongoing regimen of Synthroid. 4. Dysthymia  Stable and well-managed. Continue with ongoing regimen of Lexapro. 5. RAD (reactive airway disease) with wheezing, mild intermittent, uncomplicated  Stable and well-managed without treatment. 6. Chronic bilateral low back pain without sciatica  Not at goal. I ordered a lumbar spine x-ray to further evaluate. Will continue to monitor for improvements or changes. -     XR SPINE LUMB 2 OR 3 V; Future    7. Other urinary incontinence  Stable and well-managed. Continue with ongoing regimen of Detrol. 8. Yeast infection  I prescribed Nystatin powder to be applied on the areas. Will continue to monitor for improvements or changes. Other: I advised her to use Cetaphil on her eyelid dryness. No follow-ups on file. SUBJECTIVE/OBJECTIVE:  HPI  Rash: She reports rash on groin and under breasts. She describes rash as sore. She has used neosporin with no relief. Eczema: She reports right eyelid flakiness/dryness. Hypothyroidism. Lab Results   Component Value Date/Time    TSH 0.662 2021 09:42 AM   Thyroid ROS: denies fatigue, weight changes, heat/cold intolerance, bowel/skin changes or CVS symptoms. Hyperlipidemia:  Cardiovascular risk analysis - 70 y.o. female LDL goal is under 130.    ROS: taking medications as instructed, no medication side effects noted, no TIA's, no chest pain on exertion, no dyspnea on exertion, no swelling of ankles. Tolerating meds, no myalgias or other side effects noted. New concerns: Pt's last LDL was 112 on 3/19/2021. Low back pain: She reports low back pain, noting it occasionally keeps her from sleep. She denies shooting leg pain. She previously saw Luis Raygoza. She sees the chiropractor. She notes she stopped exercising due to pain. Mood: She is stable on Lexapro. She notes she got a puppy which has improved her mood. Asthma: She reports she is doing well. She is using inhalers. Review of Systems   Musculoskeletal: Positive for back pain. Skin: Positive for rash. All other systems reviewed and are negative. Physical Exam  Constitutional:       Appearance: Normal appearance. HENT:      Right Ear: Tympanic membrane and external ear normal.      Left Ear: Tympanic membrane and external ear normal.      Mouth/Throat:      Mouth: Mucous membranes are moist.      Pharynx: Oropharynx is clear. Cardiovascular:      Rate and Rhythm: Normal rate and regular rhythm. Pulses: Normal pulses. Heart sounds: Normal heart sounds. Pulmonary:      Effort: Pulmonary effort is normal.      Breath sounds: Normal breath sounds. Musculoskeletal: Normal range of motion. General: Tenderness (lumbar spine) present. Skin:     General: Skin is warm and dry. Neurological:      General: No focal deficit present. Mental Status: She is alert and oriented to person, place, and time. Psychiatric:         Mood and Affect: Mood normal.         Behavior: Behavior normal.       On this date 05/13/2021 I have spent 30 minutes reviewing previous notes, test results and face to face with the patient discussing the diagnosis and importance of compliance with the treatment plan as well as documenting on the day of the visit. An electronic signature was used to authenticate this note.     Written by Becky Welsh, as dictated by Dr. Julia Mota Kassy Calhoun MD.    -- [facility]

## 2021-05-13 NOTE — PATIENT INSTRUCTIONS

## 2021-05-13 NOTE — PROGRESS NOTES
This is the Subsequent Medicare Annual Wellness Exam, performed 12 months or more after the Initial AWV or the last Subsequent AWV    I have reviewed the patient's medical history in detail and updated the computerized patient record. Assessment/Plan   Education and counseling provided:  Are appropriate based on today's review and evaluation    1. Medicare annual wellness visit, subsequent  2. Mixed hyperlipidemia  3. Hypothyroidism due to acquired atrophy of thyroid  4. Dysthymia  5. RAD (reactive airway disease) with wheezing, mild intermittent, uncomplicated  6. Chronic bilateral low back pain without sciatica  -     XR SPINE LUMB 2 OR 3 V; Future       Depression Risk Factor Screening     3 most recent PHQ Screens 9/28/2015   Little interest or pleasure in doing things Not at all   Feeling down, depressed, irritable, or hopeless Not at all   Total Score PHQ 2 0       Alcohol Risk Screen    Do you average more than 1 drink per night or more than 7 drinks a week:  No    On any one occasion in the past three months have you have had more than 3 drinks containing alcohol:  Yes        Functional Ability and Level of Safety    Hearing: Hearing is good. Activities of Daily Living: The home contains: no safety equipment. Patient does total self care      Ambulation: with no difficulty     Fall Risk:  Fall Risk Assessment, last 12 mths 3/23/2021   Able to walk? Yes   Fall in past 12 months? 0   Do you feel unsteady? 0   Are you worried about falling 0   Number of falls in past 12 months -   Fall with injury?  -      Abuse Screen:  Patient is not abused       Cognitive Screening    Has your family/caregiver stated any concerns about your memory: no     Cognitive Screening: Normal - MMSE (Mini Mental Status Exam)    Health Maintenance Due     Health Maintenance Due   Topic Date Due    Medicare Yearly Exam  05/08/2021       Patient Care Team   Patient Care Team:  Talia Lindo MD as PCP - General  Emely Gregg MD as PCP - [facility] Empaneled Provider    History     Patient Active Problem List   Diagnosis Code    Depression F32.9    Hypothyroidism E03.9    Hyperlipidemia E78.5    ACP (advance care planning) Z71.89    Shortness of breath R06.02    Dyslipidemia E78.5    Chest pressure R07.89    CRUZ (dyspnea on exertion) R06.00     Past Medical History:   Diagnosis Date    Adverse effect of anesthesia     difficult to awake    Cancer ([address]     thyroid    Chronic obstructive pulmonary disease ([address]     mild    Depression     Hyperlipidemia     Unspecified hypothyroidism 2009    thyroid cancer      Past Surgical History:   Procedure Laterality Date    COLONOSCOPY N/A 2020    COLONOSCOPY performed by Iman Jenkins MD at [facility] HX  SECTION      HX CYST INCISION AND DRAINAGE Left     neg    HX HEENT      thyroid    ME THYROIDECTOMY      UPPER GI ENDOSCOPY,BIOPSY  2018          Current Outpatient Medications   Medication Sig Dispense Refill    nystatin (MYCOSTATIN) powder Apply  to affected area four (4) times daily. Put on affected area two times a day 60 g 3    tolterodine ER (DETROL LA) 4 mg ER capsule Take 1 Cap by mouth daily. 90 Cap 3    levothyroxine (SYNTHROID) 175 mcg tablet TAKE ONE TABLET BY MOUTH DAILY BEFORE BREAKFAST 90 Tab 1    escitalopram oxalate (LEXAPRO) 20 mg tablet TAKE ONE TABLET BY MOUTH DAILY 90 Tab 1    atorvastatin (LIPITOR) 10 mg tablet TAKE ONE TABLET BY MOUTH DAILY 90 Tab 1    calcium-cholecalciferol, d3, (CALCIUM 600 + D) 600-125 mg-unit tab Take  by mouth. [employer] Weight[employer] supplement      multivitamin (ONE A DAY) tablet Take 1 Tab by mouth daily. [employer] Weight[employer] supplement      naproxen sodium (ALEVE) 220 mg tablet Take 220 mg by mouth two (2) times daily (with meals).        Allergies   Allergen Reactions    Zetia [Ezetimibe] Rash       Family History   Problem Relation Age of Onset    Cancer Mother    [employer] Arthritis-osteo Mother     Breast Cancer Mother [de-identified]    Cancer Sister     Thyroid Disease Sister     Cancer Brother     Heart Failure Maternal Grandmother     Heart Failure Maternal Grandfather     Heart Failure Father      Social History     Tobacco Use    Smoking status: Never Smoker    Smokeless tobacco: Never Used   Substance Use Topics    Alcohol use:  Yes     Alcohol/week: 7.0 standard drinks     Types: 7 Glasses of wine per week     Comment: 1 per week         Salome Lunsford MD

## 2021-06-09 ENCOUNTER — OFFICE VISIT (OUTPATIENT)
Dept: INTERNAL MEDICINE CLINIC | Age: 76
End: 2021-06-09
Payer: MEDICARE

## 2021-06-09 VITALS
SYSTOLIC BLOOD PRESSURE: 130 MMHG | TEMPERATURE: 98.6 F | RESPIRATION RATE: 18 BRPM | HEART RATE: 72 BPM | HEIGHT: 65 IN | OXYGEN SATURATION: 95 % | WEIGHT: 199 LBS | BODY MASS INDEX: 33.15 KG/M2 | DIASTOLIC BLOOD PRESSURE: 76 MMHG

## 2021-06-09 DIAGNOSIS — E03.4 HYPOTHYROIDISM DUE TO ACQUIRED ATROPHY OF THYROID: ICD-10-CM

## 2021-06-09 DIAGNOSIS — R21 RASH: Primary | ICD-10-CM

## 2021-06-09 PROCEDURE — G9899 SCRN MAM PERF RSLTS DOC: HCPCS | Performed by: INTERNAL MEDICINE

## 2021-06-09 PROCEDURE — 99213 OFFICE O/P EST LOW 20 MIN: CPT | Performed by: INTERNAL MEDICINE

## 2021-06-09 PROCEDURE — 3288F FALL RISK ASSESSMENT DOCD: CPT | Performed by: INTERNAL MEDICINE

## 2021-06-09 PROCEDURE — 1090F PRES/ABSN URINE INCON ASSESS: CPT | Performed by: INTERNAL MEDICINE

## 2021-06-09 PROCEDURE — G8417 CALC BMI ABV UP PARAM F/U: HCPCS | Performed by: INTERNAL MEDICINE

## 2021-06-09 PROCEDURE — 1100F PTFALLS ASSESS-DOCD GE2>/YR: CPT | Performed by: INTERNAL MEDICINE

## 2021-06-09 PROCEDURE — G8399 PT W/DXA RESULTS DOCUMENT: HCPCS | Performed by: INTERNAL MEDICINE

## 2021-06-09 PROCEDURE — G9717 DOC PT DX DEP/BP F/U NT REQ: HCPCS | Performed by: INTERNAL MEDICINE

## 2021-06-09 PROCEDURE — G8427 DOCREV CUR MEDS BY ELIG CLIN: HCPCS | Performed by: INTERNAL MEDICINE

## 2021-06-09 PROCEDURE — 3017F COLORECTAL CA SCREEN DOC REV: CPT | Performed by: INTERNAL MEDICINE

## 2021-06-09 PROCEDURE — G8536 NO DOC ELDER MAL SCRN: HCPCS | Performed by: INTERNAL MEDICINE

## 2021-06-09 PROCEDURE — G0463 HOSPITAL OUTPT CLINIC VISIT: HCPCS | Performed by: INTERNAL MEDICINE

## 2021-06-09 RX ORDER — KETOCONAZOLE 20 MG/G
CREAM TOPICAL DAILY
Qty: 30 G | Refills: 3 | Status: SHIPPED | OUTPATIENT
Start: 2021-06-09 | End: 2022-03-23 | Stop reason: ALTCHOICE

## 2021-06-09 RX ORDER — FLUCONAZOLE 200 MG/1
200 TABLET ORAL DAILY
Qty: 5 TABLET | Refills: 0 | Status: SHIPPED | OUTPATIENT
Start: 2021-06-09 | End: 2021-06-14

## 2021-06-09 NOTE — PROGRESS NOTES
Dillan Gaona (: 1945) is a 70 y.o. female, established patient, here for evaluation of the following chief complaint(s):  Follow-up (fungal infection under breasts and in groin)       ASSESSMENT/PLAN:  Below is the assessment and plan developed based on review of pertinent history, physical exam, labs, studies, and medications. 1. Rash  -     ketoconazole (NIZORAL) 2 % topical cream; Apply  to affected area daily. , Normal, Disp-30 g, R-3  -     fluconazole (DIFLUCAN) 200 mg tablet; Take 1 Tablet by mouth daily for 5 days. FDA advises cautious prescribing of oral fluconazole in pregnancy. , Normal, Disp-5 Tablet, R-0  -     REFERRAL TO DERMATOLOGY  I prescribed ketoconazole as well as Flucanazole (for 5 days) to be taken orally. If this is ineffective, I recommended she see a dermatologist.   2. Hypothyroidism due to acquired atrophy of thyroid  Thyroid presumed stable. Not doing  labs today as recent labs were normal Pt tolerating medication. I do not recommend a change in treatment plan (Synthroid). No follow-ups on file. SUBJECTIVE/OBJECTIVE:  HPI     Rash:  Pt states her pruritic rash (under breasts and on groin) has still not gone away. She notes she tried to use athlete's foot cream in addition to mycostatin which has helped some but has not completely eliminated the problem. She states she has been trying to use cotton pads under her bra to try to absorb the moisture. Review of Systems   Skin: Positive for rash (prutitic under breasts, on groin). All other systems reviewed and are negative. Physical Exam  Constitutional:       Appearance: Normal appearance. HENT:      Right Ear: Tympanic membrane and external ear normal.      Left Ear: Tympanic membrane and external ear normal.      Mouth/Throat:      Mouth: Mucous membranes are moist.      Pharynx: Oropharynx is clear. Cardiovascular:      Rate and Rhythm: Normal rate and regular rhythm. Pulses: Normal pulses. Heart sounds: Normal heart sounds. Pulmonary:      Effort: Pulmonary effort is normal.      Breath sounds: Normal breath sounds. Musculoskeletal:         General: Normal range of motion. Skin:     General: Skin is warm and dry. Findings: Rash (under breasts, on groin) present. Neurological:      General: No focal deficit present. Mental Status: She is alert and oriented to person, place, and time. Psychiatric:         Mood and Affect: Mood normal.         Behavior: Behavior normal.           On this date 06/09/2021 I have spent 20 minutes reviewing previous notes, test results and face to face with the patient discussing the diagnosis and importance of compliance with the treatment plan as well as documenting on the day of the visit. An electronic signature was used to authenticate this note.   Written by Yossi Mccullough, as dictated by Dr. Wilbert Jain MD.  -- Lina Hudson

## 2021-08-03 PROBLEM — E78.5 HYPERLIPIDEMIA: Status: RESOLVED | Noted: 2021-08-03 | Resolved: 2021-08-03

## 2021-09-07 ENCOUNTER — TELEPHONE (OUTPATIENT)
Dept: INTERNAL MEDICINE CLINIC | Age: 76
End: 2021-09-07

## 2021-09-07 DIAGNOSIS — E03.9 ACQUIRED HYPOTHYROIDISM: ICD-10-CM

## 2021-09-07 DIAGNOSIS — R73.03 PREDIABETES: ICD-10-CM

## 2021-09-07 DIAGNOSIS — E78.2 MIXED HYPERLIPIDEMIA: Primary | ICD-10-CM

## 2021-09-07 NOTE — TELEPHONE ENCOUNTER
Pt calling states she would like to get her lab work done before her appt on 9/23. Pt would like the orders mailed to her address.  Please call back and advise

## 2021-09-22 NOTE — PROGRESS NOTES
Wyatt Owusu (: 1945) is a 78 y.o. female, established patient, here for evaluation of the following chief complaint(s):  Follow-up       ASSESSMENT/PLAN:  Below is the assessment and plan developed based on review of pertinent history, physical exam, labs, studies, and medications. 1. Dysthymia  Stable and well-managed. Continue with ongoing regimen of Lexapro. 2. Acquired hypothyroidism  -     levothyroxine (SYNTHROID) 175 mcg tablet; TAKE ONE TABLET BY MOUTH DAILY BEFORE BREAKFAST, Normal, Disp-90 Tablet, R-1  Thyroid stable. I do not recommend a change in medications. Continue with ongoing regimen of Synthroid. 3. Prediabetes  BG stable. Continue with ongoing regimen of diet and exercise. 4. Mixed hyperlipidemia  -     atorvastatin (LIPITOR) 10 mg tablet; TAKE ONE TABLET BY MOUTH DAILY, Normal, Disp-90 Tablet, R-1  Stable, patient is tolerating medications, no myalgias. I do not recommend any change in medications. Continue with ongoing regimen of Lipitor. 5. Chronic bilateral low back pain without sciatica  I encouraged the pt to continue strengthening her core and back. Will continue to monitor for improvements or changes after she consults ortho. 6. Depression  -     escitalopram oxalate (LEXAPRO) 20 mg tablet; TAKE ONE TABLET BY MOUTH DAILY, Normal, Disp-90 Tablet, R-1  Stable and well-managed. Continue with ongoing regimen of Lexapro. No follow-ups on file. SUBJECTIVE/OBJECTIVE:  HPI     Hyperlipidemia:  Cardiovascular risk analysis - 78 y.o. female LDL goal is under 130. ROS: taking medications as instructed, no medication side effects noted, no TIA's, no chest pain on exertion, no dyspnea on exertion, no swelling of ankles. Tolerating meds, no myalgias or other side effects noted. New concerns: Pt's last LDL was 93 on 3/19/21. She believes that her cholesterol has stabilized due to her healthy dietary changes. hypothyroidism.   Lab Results   Component Value Date/Time TSH 0.662 03/19/2021 09:42 AM   Thyroid ROS: denies fatigue, weight changes, heat/cold intolerance, bowel/skin changes or CVS symptoms. Back pain: Pt reports pain that wraps around her back while she walks. Her  is working on back and core strength and she has bought a tempur pedic mattress to manage her sx. She plans to consult ortho again about her sx (Dr. Celestino Spatz). Dermatology: Pt consulted Dr. Gail Caballero and expresses satisfaction with her experience and treatment. Other: Pt is working with [employer] [facility]. She recently bought a condo in [facility] and is very happy. Review of Systems   Musculoskeletal: Positive for back pain. All other systems reviewed and are negative. Physical Exam  Constitutional:       Appearance: Normal appearance. HENT:      Right Ear: Tympanic membrane and external ear normal.      Left Ear: Tympanic membrane and external ear normal.      Mouth/Throat:      Mouth: Mucous membranes are moist.      Pharynx: Oropharynx is clear. Cardiovascular:      Rate and Rhythm: Normal rate and regular rhythm. Pulses: Normal pulses. Heart sounds: Normal heart sounds. Pulmonary:      Effort: Pulmonary effort is normal.      Breath sounds: Normal breath sounds. Musculoskeletal:         General: Normal range of motion. Skin:     General: Skin is warm and dry. Neurological:      General: No focal deficit present. Mental Status: She is alert and oriented to person, place, and time. Psychiatric:         Mood and Affect: Mood normal.         Behavior: Behavior normal.     On this date 09/23/2021 I have spent 30 minutes reviewing previous notes, test results and face to face with the patient discussing the diagnosis and importance of compliance with the treatment plan as well as documenting on the day of the visit. An electronic signature was used to authenticate this note. Written by Olivier Letters as dictated by Dr. Lynn Matos.    -- Tomasz Pope Cornelia Alcala

## 2021-09-23 ENCOUNTER — OFFICE VISIT (OUTPATIENT)
Dept: INTERNAL MEDICINE CLINIC | Age: 76
End: 2021-09-23
Payer: MEDICARE

## 2021-09-23 VITALS
SYSTOLIC BLOOD PRESSURE: 121 MMHG | HEART RATE: 70 BPM | WEIGHT: 197.6 LBS | TEMPERATURE: 98.1 F | OXYGEN SATURATION: 96 % | BODY MASS INDEX: 32.92 KG/M2 | DIASTOLIC BLOOD PRESSURE: 70 MMHG | RESPIRATION RATE: 16 BRPM | HEIGHT: 65 IN

## 2021-09-23 DIAGNOSIS — F34.1 DYSTHYMIA: Primary | ICD-10-CM

## 2021-09-23 DIAGNOSIS — G89.29 CHRONIC BILATERAL LOW BACK PAIN WITHOUT SCIATICA: ICD-10-CM

## 2021-09-23 DIAGNOSIS — E78.2 MIXED HYPERLIPIDEMIA: ICD-10-CM

## 2021-09-23 DIAGNOSIS — F32.A DEPRESSION: ICD-10-CM

## 2021-09-23 DIAGNOSIS — E03.9 ACQUIRED HYPOTHYROIDISM: ICD-10-CM

## 2021-09-23 DIAGNOSIS — M54.50 CHRONIC BILATERAL LOW BACK PAIN WITHOUT SCIATICA: ICD-10-CM

## 2021-09-23 DIAGNOSIS — R73.03 PREDIABETES: ICD-10-CM

## 2021-09-23 PROCEDURE — G8417 CALC BMI ABV UP PARAM F/U: HCPCS | Performed by: INTERNAL MEDICINE

## 2021-09-23 PROCEDURE — G8536 NO DOC ELDER MAL SCRN: HCPCS | Performed by: INTERNAL MEDICINE

## 2021-09-23 PROCEDURE — G8399 PT W/DXA RESULTS DOCUMENT: HCPCS | Performed by: INTERNAL MEDICINE

## 2021-09-23 PROCEDURE — G0463 HOSPITAL OUTPT CLINIC VISIT: HCPCS | Performed by: INTERNAL MEDICINE

## 2021-09-23 PROCEDURE — 99214 OFFICE O/P EST MOD 30 MIN: CPT | Performed by: INTERNAL MEDICINE

## 2021-09-23 PROCEDURE — 1090F PRES/ABSN URINE INCON ASSESS: CPT | Performed by: INTERNAL MEDICINE

## 2021-09-23 PROCEDURE — 3017F COLORECTAL CA SCREEN DOC REV: CPT | Performed by: INTERNAL MEDICINE

## 2021-09-23 PROCEDURE — 3288F FALL RISK ASSESSMENT DOCD: CPT | Performed by: INTERNAL MEDICINE

## 2021-09-23 PROCEDURE — G9899 SCRN MAM PERF RSLTS DOC: HCPCS | Performed by: INTERNAL MEDICINE

## 2021-09-23 PROCEDURE — G8427 DOCREV CUR MEDS BY ELIG CLIN: HCPCS | Performed by: INTERNAL MEDICINE

## 2021-09-23 PROCEDURE — G9717 DOC PT DX DEP/BP F/U NT REQ: HCPCS | Performed by: INTERNAL MEDICINE

## 2021-09-23 PROCEDURE — 1100F PTFALLS ASSESS-DOCD GE2>/YR: CPT | Performed by: INTERNAL MEDICINE

## 2021-09-23 RX ORDER — LEVOTHYROXINE SODIUM 175 UG/1
TABLET ORAL
Qty: 90 TABLET | Refills: 1 | Status: SHIPPED | OUTPATIENT
Start: 2021-09-23 | End: 2022-03-23 | Stop reason: SDUPTHER

## 2021-09-23 RX ORDER — ESCITALOPRAM OXALATE 20 MG/1
TABLET ORAL
Qty: 90 TABLET | Refills: 1 | Status: SHIPPED | OUTPATIENT
Start: 2021-09-23 | End: 2022-03-23 | Stop reason: SDUPTHER

## 2021-09-23 RX ORDER — ATORVASTATIN CALCIUM 10 MG/1
TABLET, FILM COATED ORAL
Qty: 90 TABLET | Refills: 1 | Status: SHIPPED | OUTPATIENT
Start: 2021-09-23 | End: 2022-03-23 | Stop reason: SDUPTHER

## 2021-09-23 RX ORDER — MENTHOL
1000 GEL (GRAM) TOPICAL DAILY
COMMUNITY
End: 2022-03-23 | Stop reason: ALTCHOICE

## 2022-02-03 ENCOUNTER — TRANSCRIBE ORDER (OUTPATIENT)
Dept: SCHEDULING | Age: 77
End: 2022-02-03

## 2022-02-03 DIAGNOSIS — Z12.31 ENCOUNTER FOR MAMMOGRAM TO ESTABLISH BASELINE MAMMOGRAM: Primary | ICD-10-CM

## 2022-03-07 ENCOUNTER — TELEPHONE (OUTPATIENT)
Dept: INTERNAL MEDICINE CLINIC | Age: 77
End: 2022-03-07

## 2022-03-07 NOTE — TELEPHONE ENCOUNTER
Spoke with patient and advised her that due to the office mail being routed out through the hospital first it most likely would not get to her before her appointment. Faxed orders to the [facility] on [address] per patient's request.  Pt understood and was thankful for the call.

## 2022-03-07 NOTE — TELEPHONE ENCOUNTER
----- [place] sent at 3/7/2022  2:35 PM EST -----  Subject: Message to Provider    QUESTIONS  Information for Provider? Pt sees Dr. Annalise Rodriges & has active lab orders   & would like to have them mailed to her home address so she can do labs   before her appt on 3/23.  ---------------------------------------------------------------------------  --------------  [address]  What is the best way for the office to contact you? OK to leave message on   voicemail  Preferred Call Back Phone Number? [phone]  ---------------------------------------------------------------------------  --------------  SCRIPT ANSWERS  Relationship to Patient?  Self

## 2022-03-15 LAB
ALBUMIN SERPL-MCNC: 4.6 G/DL (ref 3.7–4.7)
ALBUMIN/GLOB SERPL: 2.1 {RATIO} (ref 1.2–2.2)
ALP SERPL-CCNC: 96 IU/L (ref 44–121)
ALT SERPL-CCNC: 16 IU/L (ref 0–32)
AST SERPL-CCNC: 21 IU/L (ref 0–40)
BILIRUB SERPL-MCNC: 0.9 MG/DL (ref 0–1.2)
BUN SERPL-MCNC: 15 MG/DL (ref 8–27)
BUN/CREAT SERPL: 17 (ref 12–28)
CALCIUM SERPL-MCNC: 9.6 MG/DL (ref 8.7–10.3)
CHLORIDE SERPL-SCNC: 100 MMOL/L (ref 96–106)
CHOLEST SERPL-MCNC: 202 MG/DL (ref 100–199)
CO2 SERPL-SCNC: 20 MMOL/L (ref 20–29)
CREAT SERPL-MCNC: 0.87 MG/DL (ref 0.57–1)
EGFR: 70 ML/MIN/1.73
EST. AVERAGE GLUCOSE BLD GHB EST-MCNC: 114 MG/DL
GLOBULIN SER CALC-MCNC: 2.2 G/DL (ref 1.5–4.5)
GLUCOSE SERPL-MCNC: 94 MG/DL (ref 65–99)
HBA1C MFR BLD: 5.6 % (ref 4.8–5.6)
HDLC SERPL-MCNC: 77 MG/DL
IMP & REVIEW OF LAB RESULTS: NORMAL
INTERPRETATION: NORMAL
LDLC SERPL CALC-MCNC: 105 MG/DL (ref 0–99)
POTASSIUM SERPL-SCNC: 4.5 MMOL/L (ref 3.5–5.2)
PROT SERPL-MCNC: 6.8 G/DL (ref 6–8.5)
SODIUM SERPL-SCNC: 138 MMOL/L (ref 134–144)
T4 FREE SERPL-MCNC: 2.09 NG/DL (ref 0.82–1.77)
TRIGL SERPL-MCNC: 112 MG/DL (ref 0–149)
TSH SERPL DL<=0.005 MIU/L-ACNC: 0.68 UIU/ML (ref 0.45–4.5)
VLDLC SERPL CALC-MCNC: 20 MG/DL (ref 5–40)

## 2022-03-19 PROBLEM — E78.5 DYSLIPIDEMIA: Status: ACTIVE | Noted: 2017-10-02

## 2022-03-19 PROBLEM — R06.02 SHORTNESS OF BREATH: Status: ACTIVE | Noted: 2017-10-02

## 2022-03-19 PROBLEM — R07.89 CHEST PRESSURE: Status: ACTIVE | Noted: 2017-10-02

## 2022-03-19 PROBLEM — R06.09 DOE (DYSPNEA ON EXERTION): Status: ACTIVE | Noted: 2017-10-02

## 2022-03-22 NOTE — PROGRESS NOTES
Pal Daniels (: 1945) is a 78 y.o. female, established patient, here for evaluation of the following chief complaint(s):  Follow-up       ASSESSMENT/PLAN:  Below is the assessment and plan developed based on review of pertinent history, physical exam, labs, studies, and medications. 1. Mixed hyperlipidemia  -     atorvastatin (LIPITOR) 10 mg tablet; TAKE ONE TABLET BY MOUTH DAILY, Normal, Disp-90 Tablet, R-1  Stable, patient is tolerating medications, no myalgias. I do not recommend any change in medications (Lipitor). 2. Acquired hypothyroidism  -     levothyroxine (SYNTHROID) 175 mcg tablet; TAKE ONE TABLET BY MOUTH DAILY BEFORE BREAKFAST, Normal, Disp-90 Tablet, R-1  Thyroid stable. I do not recommend a change in medications (Synthroid). 3. Other urinary incontinence  -     tolterodine ER (DETROL LA) 4 mg ER capsule; Take 1 Capsule by mouth daily. , Normal, Disp-90 Capsule, R-3  Stable and well-managed. Continue with ongoing regimen of Detrol. 4. Arthralgia of left knee  -     REFERRAL TO ORTHOPEDICS  Her x-ray 5 years ago was indicative of moderate arthritis of the L knee, which I am confident has worsened since. I strongly recommended that she consult ortho (Dr. El Sanders). I agree that her back pain is secondary to her knee pain altering her gait. 5. Mild episode of recurrent major depressive disorder (HCC)  -     escitalopram oxalate (LEXAPRO) 20 mg tablet; TAKE ONE TABLET BY MOUTH DAILY, Normal, Disp-90 Tablet, R-1  Stable and well-managed. Continue with ongoing regimen of Lexapro. SUBJECTIVE/OBJECTIVE:  HPI    Mood: Pt states that she is very happy at her new condo in [address] with her . Arthralgias: Pt c/o joint pain, particularly her L knee. Her gait is altered by her knee pain, which causes back pain. She notes mild improvement from chair yoga twice a week. Vomiting: Pt questions whether she has a hiatal hernia because she vomits when she eats too quickly. hypothyroidism. Lab Results   Component Value Date/Time    TSH 0.682 03/14/2022 11:30 AM   Thyroid ROS: denies fatigue, weight changes, heat/cold intolerance, bowel/skin changes or CVS symptoms. Hyperlipidemia:  Cardiovascular risk analysis - 78 y.o. female LDL goal is under 130. ROS: taking medications as instructed, no medication side effects noted, no TIA's, no chest pain on exertion, no dyspnea on exertion, no swelling of ankles. Tolerating meds, no myalgias or other side effects noted. New concerns: Pt's last LDL was 105 on 3/14/22. Review of Systems   Musculoskeletal: Positive for arthralgias and back pain. All other systems reviewed and are negative. Physical Exam  Constitutional:       Appearance: Normal appearance. HENT:      Right Ear: Tympanic membrane and external ear normal.      Left Ear: Tympanic membrane and external ear normal.      Mouth/Throat:      Mouth: Mucous membranes are moist.      Pharynx: Oropharynx is clear. Cardiovascular:      Rate and Rhythm: Normal rate and regular rhythm. Pulses: Normal pulses. Heart sounds: Normal heart sounds. Pulmonary:      Effort: Pulmonary effort is normal.      Breath sounds: Normal breath sounds. Musculoskeletal:         General: Normal range of motion. Skin:     General: Skin is warm and dry. Neurological:      General: No focal deficit present. Mental Status: She is alert and oriented to person, place, and time. Psychiatric:         Mood and Affect: Mood normal.         Behavior: Behavior normal.       On this date 03/23/2022 I have spent 30 minutes reviewing previous notes, test results and face to face with the patient discussing the diagnosis and importance of compliance with the treatment plan as well as documenting on the day of the visit. An electronic signature was used to authenticate this note. Written by Marina Lebron as dictated by Dr. Rodney Fernandez.    -- Marina Lebron

## 2022-03-23 ENCOUNTER — OFFICE VISIT (OUTPATIENT)
Dept: INTERNAL MEDICINE CLINIC | Age: 77
End: 2022-03-23
Payer: MEDICARE

## 2022-03-23 VITALS
DIASTOLIC BLOOD PRESSURE: 72 MMHG | HEART RATE: 75 BPM | OXYGEN SATURATION: 95 % | RESPIRATION RATE: 16 BRPM | BODY MASS INDEX: 34.16 KG/M2 | TEMPERATURE: 97.9 F | SYSTOLIC BLOOD PRESSURE: 134 MMHG | WEIGHT: 205 LBS | HEIGHT: 65 IN

## 2022-03-23 DIAGNOSIS — F33.0 MILD EPISODE OF RECURRENT MAJOR DEPRESSIVE DISORDER (HCC): ICD-10-CM

## 2022-03-23 DIAGNOSIS — M25.562 ARTHRALGIA OF LEFT KNEE: ICD-10-CM

## 2022-03-23 DIAGNOSIS — E03.9 ACQUIRED HYPOTHYROIDISM: ICD-10-CM

## 2022-03-23 DIAGNOSIS — E78.2 MIXED HYPERLIPIDEMIA: Primary | ICD-10-CM

## 2022-03-23 DIAGNOSIS — N39.498 OTHER URINARY INCONTINENCE: ICD-10-CM

## 2022-03-23 PROBLEM — F33.9 MAJOR DEPRESSIVE DISORDER, RECURRENT, UNSPECIFIED (HCC): Status: ACTIVE | Noted: 2022-03-23

## 2022-03-23 PROBLEM — F33.1 MAJOR DEPRESSIVE DISORDER, RECURRENT, MODERATE (HCC): Status: ACTIVE | Noted: 2022-03-23

## 2022-03-23 PROCEDURE — G0463 HOSPITAL OUTPT CLINIC VISIT: HCPCS | Performed by: INTERNAL MEDICINE

## 2022-03-23 PROCEDURE — G8427 DOCREV CUR MEDS BY ELIG CLIN: HCPCS | Performed by: INTERNAL MEDICINE

## 2022-03-23 PROCEDURE — 1101F PT FALLS ASSESS-DOCD LE1/YR: CPT | Performed by: INTERNAL MEDICINE

## 2022-03-23 PROCEDURE — 99214 OFFICE O/P EST MOD 30 MIN: CPT | Performed by: INTERNAL MEDICINE

## 2022-03-23 PROCEDURE — G8417 CALC BMI ABV UP PARAM F/U: HCPCS | Performed by: INTERNAL MEDICINE

## 2022-03-23 PROCEDURE — G9717 DOC PT DX DEP/BP F/U NT REQ: HCPCS | Performed by: INTERNAL MEDICINE

## 2022-03-23 PROCEDURE — 3017F COLORECTAL CA SCREEN DOC REV: CPT | Performed by: INTERNAL MEDICINE

## 2022-03-23 PROCEDURE — G8399 PT W/DXA RESULTS DOCUMENT: HCPCS | Performed by: INTERNAL MEDICINE

## 2022-03-23 PROCEDURE — G9899 SCRN MAM PERF RSLTS DOC: HCPCS | Performed by: INTERNAL MEDICINE

## 2022-03-23 PROCEDURE — G8536 NO DOC ELDER MAL SCRN: HCPCS | Performed by: INTERNAL MEDICINE

## 2022-03-23 PROCEDURE — 1090F PRES/ABSN URINE INCON ASSESS: CPT | Performed by: INTERNAL MEDICINE

## 2022-03-23 RX ORDER — ESCITALOPRAM OXALATE 20 MG/1
TABLET ORAL
Qty: 90 TABLET | Refills: 1 | Status: SHIPPED | OUTPATIENT
Start: 2022-03-23 | End: 2022-05-18 | Stop reason: SDUPTHER

## 2022-03-23 RX ORDER — ATORVASTATIN CALCIUM 10 MG/1
TABLET, FILM COATED ORAL
Qty: 90 TABLET | Refills: 1 | Status: SHIPPED | OUTPATIENT
Start: 2022-03-23 | End: 2022-05-18 | Stop reason: SDUPTHER

## 2022-03-23 RX ORDER — TOLTERODINE 4 MG/1
4 CAPSULE, EXTENDED RELEASE ORAL DAILY
Qty: 90 CAPSULE | Refills: 3 | Status: SHIPPED | OUTPATIENT
Start: 2022-03-23 | End: 2022-05-18 | Stop reason: SDUPTHER

## 2022-03-23 RX ORDER — DESONIDE 0.5 MG/G
OINTMENT TOPICAL 2 TIMES DAILY
COMMUNITY

## 2022-03-23 RX ORDER — LEVOTHYROXINE SODIUM 175 UG/1
TABLET ORAL
Qty: 90 TABLET | Refills: 1 | Status: SHIPPED | OUTPATIENT
Start: 2022-03-23 | End: 2022-05-18 | Stop reason: SDUPTHER

## 2022-03-24 PROBLEM — F33.9 MAJOR DEPRESSIVE DISORDER, RECURRENT, UNSPECIFIED (HCC): Status: ACTIVE | Noted: 2022-03-23

## 2022-03-24 PROBLEM — F33.1 MAJOR DEPRESSIVE DISORDER, RECURRENT, MODERATE (HCC): Status: ACTIVE | Noted: 2022-03-23

## 2022-03-24 PROBLEM — F33.0 MAJOR DEPRESSIVE DISORDER, RECURRENT, MILD (HCC): Status: ACTIVE | Noted: 2022-03-23

## 2022-04-26 ENCOUNTER — HOSPITAL ENCOUNTER (OUTPATIENT)
Dept: MAMMOGRAPHY | Age: 77
Discharge: HOME | End: 2022-04-26
Attending: INTERNAL MEDICINE
Payer: MEDICARE

## 2022-04-26 DIAGNOSIS — Z12.31 ENCOUNTER FOR MAMMOGRAM TO ESTABLISH BASELINE MAMMOGRAM: ICD-10-CM

## 2022-04-26 PROCEDURE — 77067 SCR MAMMO BI INCL CAD: CPT

## 2022-05-02 NOTE — PERIOP NOTES
Report from Brandy Ballesteros, JOEL, see anesthesia record. ABD remains soft and non-tender post procedure. Pt has no complaints at this time and tolerated the procedure well. Endoscope was pre-cleaned at bedside immediately following procedure by [facility]. This is a historical note converted from Brinda. Formatting and pictures may have been removed.  Please reference Brinda for original formatting and attached multimedia. Chief Complaint  Follow up. Recent ED visit for Acute pyelonephritis. c/o chills and cold all the time. Las and urine done today.  History of Present Illness  Ms. Goodman is a 64-year-old  woman with PMH of fibromyalgia, anxiety, COPD, hypothyroidism, and RA who is presenting to the [facility] for a follow up visit. Last clinic visit was in?Feb 2019.??Patient has a history of COPD that was treated for many years at [facility], but?is now at the?[facility].??She is on?Anoro Ellipta and an albuterol rescue inhaler.??Alpha-1 antitrypsin lab was negative on 12/7/2018.??PFT done in [facility] showed lung function at 45% of predicted.??Saw Gyn and received Pap and mammogram on 11/19/2018. She was instructed to restart her levothyroxine medications for hypothyroidism.??Low-dose lung cancer screen on 4/26/2019 noted Lung-RADS 4A, suspicious right lower lobe nodule with recommendations for 3-month follow up low-dose CT or PET/CT. ?Also noted centrilobular emphysema. ?Rheumatology?visit on 5/2/2019 for RF Ab positive and anti-CCP positive RA. ?Meloxicam helps with the pain. ?Plans to start medication, likely methotrexate, but getting T Spot test and Hep B total Ig. ?She also got a Medrol Dose Tony. ?She was to follow up with Rheumatology within 1 week after 5/2/2019. ?She presented to the [facility] ED on 5/19/2019 with pyelonephritis and was treated with Cipro.  ?   Today, she is presenting with  Urinary urgency, straining, weakness, pain lower abdomen radiating to the back, vomiting.? Started on Protonix, Carafate, and Cipro.  Switched from the dessicated thyroid hormone formulation to levothyroxin 75 mcg for the past 3 days  Review of Systems  Constitutional: No fevers; No chills; No night sweats; No?fatigue  HEENT: No headache; No  acute vision changes  Cardiovascular: No chest pain; No palpitations; No orthopnea; No LINDA  Respiratory: No SOB; No cough; No wheezing  Gastrointestinal: No abdominal pain; No nausea; No vomiting; No diarrhea; No constipation; No appetite changes  Musculoskeletal: No muscle pain  Skin: No rashes, bruises,?or lesions  Neurological: No headaches; No dizziness; No seizures, No numbness  Physical Exam  Vitals & Measurements  T:?36.7? ?C (Oral)? HR:?90(Peripheral)? RR:?18? BP:?154/80?  HT:?154?cm? WT:?46.9?kg? BMI:?19.78?  GENERAL: Alert and Oriented to person, place, time, and event;?In no?acute distress  HEENT: Extra-ocular motor grossly intact; Normal conjunctiva  NECK: Supple; No lymphadenopathy; No JVD;?No masses or thyromegaly  CARDIOVASCULAR: Regular rate and rhythm; S1/S2 NML; No murmurs, rubs or gallops; No carotid bruits  RESPIRATORY: Clear to auscultation bilaterally; No rhonchi, rales, or wheezing  GASTROINTESTINAL: Soft; Not tender; Not distended; BS+; No guarding  GENITOURINARY: No CVA tenderness; No tenderness with palpation of the hypogastric abdominal region  MUSKULOSKELETAL: Normal ROM; Normal muscular development  EXTREMETIES: No peripheral edema; Peripheral pulses intact  SKIN: No new rashes; Warm, Dry, Intact skin  NEURO: CN II-XII grossly intact;?No?focal neurological deficits  PSYCHIATRIC: Cooperative; Appropriate mood and affect?  Assessment/Plan  Pyelonephritis  - Presented to the ED on 5/19/2019?with pyelonephritis based on UA and CT abdomen/pelvis  - Was placed on Cipro therapy for 10 days  - Ordered urine culture  - Her presenting symptoms today may be related to her hypothyroidism and partially from recovering pyelonephritis  ?  Hypothyroidism  - No longer taking desiccated?thyroid hormone, return back to taking levothyroxin 75 ?g daily  - Her presenting symptoms today may be related to her hypothyroidism and partially from recovering pyelonephritis  - Will continue on the levothyroxin 75 mcg  dose and reassess TSH and free T4 in 1 month  ?  Rheumatoid arthritis  - Saw Rheumatology on 5/2/2019?with plans to start?therapy, assuming methotrexate,?pending?T Spot?and hepatitis B total Ig results  - Given prednisone until able to start treatment with Rheumatology  ?  COPD  - No complaints at this time  - Continue on current medications  ?  Preventative health  -?CAGE score?0/4  - Smoking cessation discussion.? Down to 5 - 6 cigarettes per day.? In [facility] smoking cessation program.  - Screening mammo already ordered.? Discussed with patient about getting it done.? Patient agreed.  - VZV prescription given  - ASA therapy discussion deferred  - COPD management.? No issues at this time  ?  ?  Return to the [facility] IM clinic in?4 months for follow-up visit   Problem List/Past Medical History  Ongoing  Arthritis  COPD - Chronic obstructive pulmonary disease  Graves disease  Tobacco user  Historical  No qualifying data  Procedure/Surgical History  Bilateral tubal ligation  Cholecystectomy;  Thyroidectomy   Medications  albuterol 2.5 mg/3 mL (0.083%) inhalation solution, 2.5 mg= 3 mL, INH, q6hr, PRN  Carafate 1 g oral tablet, 1 gm= 1 tab(s), Oral, QID  clonazePAM 1 mg oral tablet  Phenergan 25 mg/mL injectable solution, 25 mg= 1 mL, IM, q4hr,? ?Not Taking, Completed Rx  Protonix 40 mg ORAL enteric coated tablet, 40 mg= 1 tab(s), Oral, Daily  QUEtiapine 200 mg oral tablet, extended release, 200 mg= 1 tab(s), Oral, Daily, 3 refills  Rocephin 1 g injection, 1 gm= 1 EA, IM, Once,? ?Not Taking, Completed Rx  Symbicort 80 mcg-4.5 mcg/inh inhalation aerosol, 2 puff(s), INH, Daily, 6 refills  Synthroid 75 mcg (0.075 mg) oral tablet, 1 tab(s), Oral, Daily  THYROID NP 1GR (60MG) TABLETS, 60 mg= 1 tab(s), BID,? ?Not taking  tiZANidine 4 mg oral tablet, 4 mg= 1 tab(s), Oral, TID  Zofran ODT 8 mg oral tablet, disintegrating, 8 mg= 1 tab(s), Oral, TID, PRN  Allergies  Bactrim?(vomiting)  Social History  Alcohol  Past,  05/14/2018  Employment/School  Unemployed, Highest education level: High school., 10/23/2018  Home/Environment  Lives with Spouse. Living situation: Home/Independent. Alcohol abuse in household: No. Substance abuse in household: No. Smoker in household: Yes. Feels unsafe at home: No. Safe place to go: Yes. Family/Friends available for support: Yes., 10/23/2018  Nutrition/Health  Regular, 05/29/2019  Sexual  Sexually active: Yes. Uses condoms: No., 11/19/2018  Substance Abuse  Past, 12/05/2017  Tobacco  5-9 cigarettes (between 1/4 to 1/2 pack)/day in last 30 days, Cigarettes, No, Ready to change: No. Previous treatment: None., 05/29/2019  Family History  Diabetes mellitus type 2: Father.  Immunizations  Vaccine Date Status   pneumococcal 13-valent conjugate vaccine 02/13/2019 Given   tetanus/diphtheria/pertussis, acel(Tdap) 10/23/2018 Given   influenza virus vaccine, inactivated 10/23/2018 Given   influenza virus vaccine, inactivated 10/19/2011 Recorded   Health Maintenance  Health Maintenance  ???Pending?(in the next year)  ??? ??OverDue  ??? ? ? ?Diabetes Screening due??and every?  ??? ? ? ?COPD Management-COPD Medications Prescribed due??10/05/18??and every 1??year(s)  ??? ? ? ?Alcohol Misuse Screening due??01/01/19??and every 1??year(s)  ??? ? ? ?Smoking Cessation due??01/01/19??and every 1??year(s)  ??? ??Due?  ??? ? ? ?Aspirin Therapy for CVD Prevention due??05/29/19??and every 1??year(s)  ??? ? ? ?Breast Cancer Screening due??05/29/19??and every?  ??? ? ? ?COPD Maintenance-Pulmonary Rehab Education due??05/29/19??and every 1??year(s)  ??? ? ? ?COPD Maintenance-Spirometry due??05/29/19??and every?  ??? ? ? ?Zoster Vaccine due??05/29/19??and every 100??year(s)  ??? ??Due In Future?  ??? ? ? ?Colorectal Screening not due until??11/18/19??and every 1??year(s)  ??? ? ? ?Depression Screening not due until??11/19/19??and every 1??year(s)  ??? ? ? ?Obesity Screening not due until??01/01/20??and every 1??year(s)  ??? ?  ? ?Lung Cancer Screening not due until??04/26/20??and every 1??year(s)  ??? ? ? ?ADL Screening not due until??05/02/20??and every 1??year(s)  ??? ? ? ?COPD Management-Oxygen Assessment not due until??05/20/20??and every 1??year(s)  ??? ? ? ?Blood Pressure Screening not due until??05/28/20??and every 1??year(s)  ??? ? ? ?Body Mass Index Check not due until??05/28/20??and every 1??year(s)  ???Satisfied?(in the past 1 year)  ??? ??Satisfied?  ??? ? ? ?ADL Screening on??05/02/19.??Satisfied by Lisa Poe LPN  ??? ? ? ?Alcohol Misuse Screening on??10/23/18.??Satisfied by Holli Munoz MD  ??? ? ? ?Blood Pressure Screening on??05/29/19.??Satisfied by Anthony Bryson RN  ??? ? ? ?Body Mass Index Check on??05/29/19.??Satisfied by Anthony Bryson RN  ??? ? ? ?COPD Management-Oxygen Assessment on??05/20/19.??Satisfied by Arelis Poe RN  ??? ? ? ?Cervical Cancer Screening on??11/19/18.??Satisfied by Hollie Aldana  ??? ? ? ?Colorectal Screening on??11/18/18.??Satisfied by Sadaf Dutton  ??? ? ? ?Depression Screening on??11/19/18.??Satisfied by Suha Osborne LPN  ??? ? ? ?Diabetes Screening on??05/19/19.??Satisfied by Noel García  ??? ? ? ?Influenza Vaccine on??10/23/18.??Satisfied by Roseanne Mcghee LPN  ??? ? ? ?Lipid Screening on??11/19/18.??Satisfied by Deanna Carpio  ??? ? ? ?Lung Cancer Screening on??04/26/19.??Satisfied by Major Merritt RT, Pedro GLASS.  ??? ? ? ?Obesity Screening on??05/29/19.??Satisfied by Braydon RN, Anthony Mantilla  ??? ? ? ?Smoking Cessation on??06/01/18.??Satisfied by Wendy Neumann LPN  ??? ? ? ?Smoking Cessation (Coronary Artery Disease) on??06/01/18.??Satisfied by Wendy Neumann LPN  ??? ? ? ?Tetanus Vaccine on??10/23/18.??Satisfied by Roseanne Mcghee LPN  ?  ?      Reviewed chart. Discussed assessment and plan with resident, necessary care provided.??Agree with all the above findings.   Screening mammogram on 6/11/2019 showing BI-RADS2, benign.?  According to USPSTF recommendations, next scheduled screening mammogram in 2 years (June 2021).   Patient low-dose CT scan for lung cancer screen positive for suspicious nodule in the right lower lobe without mediastinal lymphadenopathy.? Referred to the [facility] at the [facility].? She was evaluated on 6/3/2019.? Recommended PET scan.? If PET positive, then will proceed with aggressive management including surgical resection vs. biopsy.? Will continue to follow.

## 2022-07-13 NOTE — PROGRESS NOTES
Carline Muñoz (: 1945) is a 78 y.o. female, established patient, here for evaluation of the following chief complaint(s):  Vomiting (vomiting after eating)       ASSESSMENT/PLAN:  Below is the assessment and plan developed based on review of pertinent history, physical exam, labs, studies, and medications. 1. Medicare annual wellness visit, subsequent  2. Acquired hypothyroidism-cont current dose and tolerating medication   -     levothyroxine (SYNTHROID) 175 mcg tablet; TAKE ONE TABLET BY MOUTH DAILY BEFORE BREAKFAST, Normal, Disp-90 Tablet, R-1  -     TSH 3RD GENERATION; Future  -     T4, FREE; Future  3. Mixed hyperlipidemia-stable will rehceck levels  -     atorvastatin (LIPITOR) 10 mg tablet; TAKE ONE TABLET BY MOUTH DAILY, Normal, Disp-90 Tablet, R-1  -     METABOLIC PANEL, COMPREHENSIVE; Future  -     LIPID PANEL; Future  4. Mild episode of recurrent major depressive disorder (HCC)-she is having hard time with sell of home and moving but we will revisit how she is doing end of September- feel this is normal reaction to stress of the move  -     escitalopram oxalate (LEXAPRO) 20 mg tablet; TAKE ONE TABLET BY MOUTH DAILY, Normal, Disp-90 Tablet, R-1  5. Other urinary incontinence-stable with detrol and tolerating medicine   -     tolterodine ER (DETROL LA) 4 mg ER capsule; Take 1 Capsule by mouth daily. , Normal, Disp-90 Capsule, R-1  -     CBC W/O DIFF; Future  6. Nausea and vomiting, unspecified vomiting type-I suspect hiatal hernia or possibly reflux as cause of vomiting with food occasionally so will check   -     XR BA SWALLOW ESOPHOGRAM; Future      No follow-ups on file. SUBJECTIVE/OBJECTIVE:  HPI   Her back has been tight and has an appt with Kassie Penn and got an cortisone shot in her left knee and that did help  Seeing chiropractor     She does vomit when she eats too quickly-   SUBJECTIVE: Carline Muñoz is a 78 y.o. female here for follow up of hypothyroidism.     Lab Results Component Value Date/Time    TSH 0.682 03/14/2022 11:30 AM     Thyroid ROS: denies fatigue, weight changes, heat/cold intolerance, bowel/skin changes or CVS symptoms. Subjective:   Jyoti Webster is a 78 y.o. female with hyperlipidemia. Cardiovascular risk analysis - 78 y.o. female LDL goal is under 100. ROS: taking medications as instructed, no medication side effects noted, no TIA's, no chest pain on exertion, no dyspnea on exertion, no swelling of ankles. Tolerating meds, no myalgias or other side effects noted  New concerns: tolerating medication- lipitor . Depression- she is taking the lexapro and tolerating medicine; she is selling the house and it is overwhelming ; she has to be out of house August 11     Urinary incontinence- she is tolerating the detrol     Review of Systems   All other systems reviewed and are negative. Physical Exam  Vitals and nursing note reviewed. Constitutional:       Appearance: She is well-developed. HENT:      Head: Normocephalic and atraumatic. Right Ear: External ear normal.      Left Ear: External ear normal.      Nose: Nose normal.   Neck:      Thyroid: No thyroid mass or thyromegaly. Vascular: No carotid bruit or JVD. Cardiovascular:      Rate and Rhythm: Normal rate and regular rhythm. Pulses: Normal pulses. Heart sounds: Normal heart sounds, S1 normal and S2 normal. No murmur heard. No friction rub. No gallop. Pulmonary:      Effort: Pulmonary effort is normal.      Breath sounds: Normal breath sounds. Abdominal:      General: Bowel sounds are normal.      Palpations: Abdomen is soft. Musculoskeletal:         General: Normal range of motion. Cervical back: Normal range of motion and neck supple. Skin:     General: Skin is warm and dry. Neurological:      Mental Status: She is alert and oriented to person, place, and time. Psychiatric:         Behavior: Behavior normal.         Thought Content:  Thought content normal.         Judgment: Judgment normal.           On this date 07/15/2022 I have spent 30 minutes reviewing previous notes, test results and face to face with the patient discussing the diagnosis and importance of compliance with the treatment plan as well as documenting on the day of the visit. An electronic signature was used to authenticate this note. -- Pepe Aguilar MD   This is the Subsequent Medicare Annual Wellness Exam, performed 12 months or more after the Initial AWV or the last Subsequent AWV    I have reviewed the patient's medical history in detail and updated the computerized patient record. Assessment/Plan   Education and counseling provided:  Are appropriate based on today's review and evaluation    1. Medicare annual wellness visit, subsequent  2. Acquired hypothyroidism  -     levothyroxine (SYNTHROID) 175 mcg tablet; TAKE ONE TABLET BY MOUTH DAILY BEFORE BREAKFAST, Normal, Disp-90 Tablet, R-1  -     TSH 3RD GENERATION; Future  -     T4, FREE; Future  3. Mixed hyperlipidemia  -     atorvastatin (LIPITOR) 10 mg tablet; TAKE ONE TABLET BY MOUTH DAILY, Normal, Disp-90 Tablet, R-1  -     METABOLIC PANEL, COMPREHENSIVE; Future  -     LIPID PANEL; Future  4. Mild episode of recurrent major depressive disorder (HCC)  -     escitalopram oxalate (LEXAPRO) 20 mg tablet; TAKE ONE TABLET BY MOUTH DAILY, Normal, Disp-90 Tablet, R-1  5. Other urinary incontinence  -     tolterodine ER (DETROL LA) 4 mg ER capsule; Take 1 Capsule by mouth daily. , Normal, Disp-90 Capsule, R-1  -     CBC W/O DIFF; Future  6.  Nausea and vomiting, unspecified vomiting type  -     XR BA SWALLOW ESOPHOGRAM; Future       Depression Risk Factor Screening     3 most recent PHQ Screens 7/15/2022   Little interest or pleasure in doing things Several days   Feeling down, depressed, irritable, or hopeless Several days   Total Score PHQ 2 2       Alcohol & Drug Abuse Risk Screen    Do you average more than 1 drink per night or more than 7 drinks a week:  No    On any one occasion in the past three months have you have had more than 3 drinks containing alcohol:  No          Functional Ability and Level of Safety    Hearing: Hearing is good. Activities of Daily Living: The home contains: no safety equipment. Patient does total self care      Ambulation: with no difficulty     Fall Risk:  Fall Risk Assessment, last 12 mths 7/15/2022   Able to walk? Yes   Fall in past 12 months? 1   Do you feel unsteady? 1   Are you worried about falling 1   Number of falls in past 12 months 2   Fall with injury? 0      Abuse Screen:  Patient is not abused       Cognitive Screening    Has your family/caregiver stated any concerns about your memory: no     Cognitive Screening: Normal - MMSE (Mini Mental Status Exam)    Health Maintenance Due     There are no preventive care reminders to display for this patient. Patient Care Team   Patient Care Team:  Radha Gomez MD as PCP - General  Shah-Pandya, Ladd Scheuermann, MD as PCP - [address] [place] [address] [facility]    History     Patient Active Problem List   Diagnosis Code    Depression F32. A    Hypothyroidism E03.9    ACP (advance care planning) Z71.89    Shortness of breath R06.02    Dyslipidemia E78.5    Chest pressure R07.89    CRUZ (dyspnea on exertion) R06.00    Major depressive disorder, recurrent, mild F33.0    Major depressive disorder, recurrent, moderate F33.1    Major depressive disorder, recurrent, unspecified F33.9     Past Medical History:   Diagnosis Date    Adverse effect of anesthesia     difficult to awake    Cancer (HCC)     thyroid    Chronic obstructive pulmonary disease (HCC)     mild    Depression     Hyperlipidemia     Unspecified hypothyroidism 2009    thyroid cancer      Past Surgical History:   Procedure Laterality Date    COLONOSCOPY N/A 2020    COLONOSCOPY performed by Hugh Brownlee MD at [address] [place] [address] HX  SECTION      HX CYST INCISION AND DRAINAGE Left 1997    neg    HX HEENT      thyroid    SC THYROIDECTOMY      UPPER GI ENDOSCOPY,BIOPSY  8/2/2018          Current Outpatient Medications   Medication Sig Dispense Refill    escitalopram oxalate (LEXAPRO) 20 mg tablet TAKE ONE TABLET BY MOUTH DAILY 90 Tablet 1    levothyroxine (SYNTHROID) 175 mcg tablet TAKE ONE TABLET BY MOUTH DAILY BEFORE BREAKFAST 90 Tablet 1    tolterodine ER (DETROL LA) 4 mg ER capsule Take 1 Capsule by mouth daily. 90 Capsule 1    atorvastatin (LIPITOR) 10 mg tablet TAKE ONE TABLET BY MOUTH DAILY 90 Tablet 1    desonide (DESOWEN) 0.05 % topical ointment Apply  to affected area two (2) times a day.  naproxen sodium (ALEVE) 220 mg tablet Take 220 mg by mouth two (2) times daily (with meals).  calcium-cholecalciferol, d3, (CALCIUM 600 + D) 600-125 mg-unit tab Take  by mouth. Medi Weightloss supplement (Patient not taking: Reported on 7/15/2022)       Allergies   Allergen Reactions    Zetia [Ezetimibe] Rash       Family History   Problem Relation Age of Onset    Cancer Mother     OSTEOARTHRITIS Mother     Breast Cancer Mother [de-identified]    Cancer Sister     Thyroid Disease Sister     Cancer Brother     Heart Failure Maternal Grandmother     Heart Failure Maternal Grandfather     Heart Failure Father      Social History     Tobacco Use    Smoking status: Never Smoker    Smokeless tobacco: Never Used   Substance Use Topics    Alcohol use:  Yes     Alcohol/week: 7.0 standard drinks     Types: 7 Glasses of wine per week     Comment: 1 per week         Khang Atkins MD

## 2022-07-15 ENCOUNTER — OFFICE VISIT (OUTPATIENT)
Dept: INTERNAL MEDICINE CLINIC | Age: 77
End: 2022-07-15
Payer: MEDICARE

## 2022-07-15 VITALS
DIASTOLIC BLOOD PRESSURE: 82 MMHG | WEIGHT: 207.2 LBS | HEIGHT: 65 IN | SYSTOLIC BLOOD PRESSURE: 133 MMHG | RESPIRATION RATE: 16 BRPM | OXYGEN SATURATION: 95 % | TEMPERATURE: 99.1 F | HEART RATE: 77 BPM | BODY MASS INDEX: 34.52 KG/M2

## 2022-07-15 DIAGNOSIS — F33.0 MILD EPISODE OF RECURRENT MAJOR DEPRESSIVE DISORDER (HCC): ICD-10-CM

## 2022-07-15 DIAGNOSIS — E03.9 ACQUIRED HYPOTHYROIDISM: ICD-10-CM

## 2022-07-15 DIAGNOSIS — Z00.00 MEDICARE ANNUAL WELLNESS VISIT, SUBSEQUENT: Primary | ICD-10-CM

## 2022-07-15 DIAGNOSIS — N39.498 OTHER URINARY INCONTINENCE: ICD-10-CM

## 2022-07-15 DIAGNOSIS — R11.2 NAUSEA AND VOMITING, UNSPECIFIED VOMITING TYPE: ICD-10-CM

## 2022-07-15 DIAGNOSIS — E78.2 MIXED HYPERLIPIDEMIA: ICD-10-CM

## 2022-07-15 PROCEDURE — 1090F PRES/ABSN URINE INCON ASSESS: CPT | Performed by: INTERNAL MEDICINE

## 2022-07-15 PROCEDURE — G8417 CALC BMI ABV UP PARAM F/U: HCPCS | Performed by: INTERNAL MEDICINE

## 2022-07-15 PROCEDURE — 99214 OFFICE O/P EST MOD 30 MIN: CPT | Performed by: INTERNAL MEDICINE

## 2022-07-15 PROCEDURE — G0463 HOSPITAL OUTPT CLINIC VISIT: HCPCS | Performed by: INTERNAL MEDICINE

## 2022-07-15 PROCEDURE — 1101F PT FALLS ASSESS-DOCD LE1/YR: CPT | Performed by: INTERNAL MEDICINE

## 2022-07-15 PROCEDURE — G8536 NO DOC ELDER MAL SCRN: HCPCS | Performed by: INTERNAL MEDICINE

## 2022-07-15 PROCEDURE — G8427 DOCREV CUR MEDS BY ELIG CLIN: HCPCS | Performed by: INTERNAL MEDICINE

## 2022-07-15 PROCEDURE — G0439 PPPS, SUBSEQ VISIT: HCPCS | Performed by: INTERNAL MEDICINE

## 2022-07-15 PROCEDURE — G9717 DOC PT DX DEP/BP F/U NT REQ: HCPCS | Performed by: INTERNAL MEDICINE

## 2022-07-15 PROCEDURE — G9899 SCRN MAM PERF RSLTS DOC: HCPCS | Performed by: INTERNAL MEDICINE

## 2022-07-15 PROCEDURE — 3017F COLORECTAL CA SCREEN DOC REV: CPT | Performed by: INTERNAL MEDICINE

## 2022-07-15 PROCEDURE — G8399 PT W/DXA RESULTS DOCUMENT: HCPCS | Performed by: INTERNAL MEDICINE

## 2022-07-15 RX ORDER — ATORVASTATIN CALCIUM 10 MG/1
TABLET, FILM COATED ORAL
Qty: 7 TABLET | Refills: 0 | Status: SHIPPED | OUTPATIENT
Start: 2022-07-15 | End: 2022-07-15 | Stop reason: SDUPTHER

## 2022-07-15 RX ORDER — LEVOTHYROXINE SODIUM 175 UG/1
TABLET ORAL
Qty: 90 TABLET | Refills: 1 | Status: SHIPPED | OUTPATIENT
Start: 2022-07-15 | End: 2022-09-29 | Stop reason: SDUPTHER

## 2022-07-15 RX ORDER — ESCITALOPRAM OXALATE 20 MG/1
TABLET ORAL
Qty: 90 TABLET | Refills: 1 | Status: SHIPPED | OUTPATIENT
Start: 2022-07-15 | End: 2022-09-29 | Stop reason: SDUPTHER

## 2022-07-15 RX ORDER — ESCITALOPRAM OXALATE 20 MG/1
TABLET ORAL
Qty: 7 TABLET | Refills: 0 | Status: SHIPPED | OUTPATIENT
Start: 2022-07-15 | End: 2022-07-15 | Stop reason: SDUPTHER

## 2022-07-15 RX ORDER — TOLTERODINE 4 MG/1
4 CAPSULE, EXTENDED RELEASE ORAL DAILY
Qty: 7 CAPSULE | Refills: 0 | Status: SHIPPED | OUTPATIENT
Start: 2022-07-15 | End: 2022-07-15 | Stop reason: SDUPTHER

## 2022-07-15 RX ORDER — TOLTERODINE 4 MG/1
4 CAPSULE, EXTENDED RELEASE ORAL DAILY
Qty: 90 CAPSULE | Refills: 1 | Status: SHIPPED | OUTPATIENT
Start: 2022-07-15 | End: 2022-09-29 | Stop reason: SDUPTHER

## 2022-07-15 RX ORDER — ATORVASTATIN CALCIUM 10 MG/1
TABLET, FILM COATED ORAL
Qty: 90 TABLET | Refills: 1 | Status: SHIPPED | OUTPATIENT
Start: 2022-07-15 | End: 2022-09-29 | Stop reason: SDUPTHER

## 2022-07-15 RX ORDER — LEVOTHYROXINE SODIUM 175 UG/1
TABLET ORAL
Qty: 7 TABLET | Refills: 0 | Status: SHIPPED | OUTPATIENT
Start: 2022-07-15 | End: 2022-07-15 | Stop reason: SDUPTHER

## 2022-07-15 NOTE — PATIENT INSTRUCTIONS

## 2022-08-26 ENCOUNTER — TRANSCRIBE ORDER (OUTPATIENT)
Dept: SCHEDULING | Age: 77
End: 2022-08-26

## 2022-08-26 DIAGNOSIS — M54.16 LUMBAR RADICULOPATHY: Primary | ICD-10-CM

## 2022-09-28 NOTE — PROGRESS NOTES
Bobbi Downs (: 1945) is a 78 y.o. female, established patient, here for evaluation of the following chief complaint(s):  Follow-up (Patients wants a flu shot and discuss labs )       ASSESSMENT/PLAN:  Below is the assessment and plan developed based on review of pertinent history, physical exam, labs, studies, and medications. 1. Acquired hypothyroidism-TSH has been stable continue current dose of levothyroxine and we need to recheck labs today  -     TSH 3RD GENERATION; Future  -     T4, FREE; Future  -     levothyroxine (SYNTHROID) 175 mcg tablet; TAKE ONE TABLET BY MOUTH DAILY BEFORE BREAKFAST, Normal, Disp-90 Tablet, R-1  2. Mixed hyperlipidemia-continue current dose  lipitor  10 mg once a day  -     METABOLIC PANEL, COMPREHENSIVE; Future  -     LIPID PANEL; Future  -     atorvastatin (LIPITOR) 10 mg tablet; TAKE ONE TABLET BY MOUTH DAILY, Normal, Disp-90 Tablet, R-1  3. Mild episode of recurrent major depressive disorder (HCC)-continue with Lexapro 20 mg once a day and seems to be tolerating it and adjusting to her new move  -     escitalopram oxalate (LEXAPRO) 20 mg tablet; TAKE ONE TABLET BY MOUTH DAILY, Normal, Disp-90 Tablet, R-1  4. Other urinary incontinence-continue with Detrol 4 mg once a day  -     tolterodine ER (DETROL LA) 4 mg ER capsule; Take 1 Capsule by mouth daily. , Normal, Disp-90 Capsule, R-1  5. Nausea and vomiting, unspecified vomiting type-this is definitely improved since all the stress that she was having earlier but I still want her to get a barium swallow just to look for underlying acid and swallowing issues  -     CBC W/O DIFF; Future  6. Needs flu shot  -     INFLUENZA, FLUAD, (AGE 72 Y+), IM, PF, 0.5 ML        SUBJECTIVE/OBJECTIVE:  HPI  SUBJECTIVE: Bobbi Downs is a 78 y.o. female here for follow up of hypothyroidism.     Lab Results   Component Value Date/Time    TSH 0.682 2022 11:30 AM     Thyroid ROS: denies fatigue, weight changes, heat/cold intolerance, bowel/skin changes or CVS symptoms. Subjective:   Curry Sue is a 78 y.o. female with hyperlipidemia. Cardiovascular risk analysis - 78 y.o. female LDL goal is under 100. ROS: taking medications as instructed, no medication side effects noted, no TIA's, no chest pain on exertion, no dyspnea on exertion, no swelling of ankles. Tolerating meds, no myalgias or other side effects noted  New concerns: 152/58. Depression- they were suppose to sell the house and be out by August 11 and they have    She was suppose to have a barium swallow for nausea and vomiting but it has not been done - gave her number to schedule so we can make sure it is done     Urinary incontinence- she takes the detrol and that has kept her symptoms table    In terms of her back an MRI of lumbar spine was ordered by  to explore the DJD and her left knee with  Dr. Rebecca Tan; she did have cortisone shot in her left knee before she left three months ago ; she uses a cane and not walking as much     Review of Systems   All other systems reviewed and are negative. Physical Exam  Vitals and nursing note reviewed. Constitutional:       Appearance: She is well-developed. HENT:      Head: Normocephalic and atraumatic. Right Ear: External ear normal.      Left Ear: External ear normal.      Nose: Nose normal.   Neck:      Thyroid: No thyroid mass or thyromegaly. Vascular: No carotid bruit or JVD. Cardiovascular:      Rate and Rhythm: Normal rate and regular rhythm. Pulses: Normal pulses. Heart sounds: Normal heart sounds, S1 normal and S2 normal. No murmur heard. No friction rub. No gallop. Pulmonary:      Effort: Pulmonary effort is normal.      Breath sounds: Normal breath sounds. Abdominal:      General: Bowel sounds are normal.      Palpations: Abdomen is soft. Musculoskeletal:         General: Normal range of motion. Cervical back: Normal range of motion and neck supple. Skin:     General: Skin is warm and dry. Neurological:      Mental Status: She is alert and oriented to person, place, and time. Psychiatric:         Behavior: Behavior normal.         Thought Content: Thought content normal.         Judgment: Judgment normal.       On this date 09/29/2022 I have spent 35 minutes reviewing previous notes, test results and face to face with the patient discussing the diagnosis and importance of compliance with the treatment plan as well as documenting on the day of the visit. An electronic signature was used to authenticate this note.   -- Kennis Mcardle, MD

## 2022-09-29 ENCOUNTER — OFFICE VISIT (OUTPATIENT)
Dept: INTERNAL MEDICINE CLINIC | Age: 77
End: 2022-09-29
Payer: MEDICARE

## 2022-09-29 ENCOUNTER — HOSPITAL ENCOUNTER (OUTPATIENT)
Dept: MRI IMAGING | Age: 77
Discharge: HOME | End: 2022-09-29
Attending: ORTHOPAEDIC SURGERY
Payer: MEDICARE

## 2022-09-29 VITALS
WEIGHT: 209 LBS | TEMPERATURE: 98.1 F | HEART RATE: 70 BPM | RESPIRATION RATE: 14 BRPM | BODY MASS INDEX: 34.82 KG/M2 | HEIGHT: 65 IN | SYSTOLIC BLOOD PRESSURE: 130 MMHG | OXYGEN SATURATION: 96 % | DIASTOLIC BLOOD PRESSURE: 70 MMHG

## 2022-09-29 DIAGNOSIS — E03.9 ACQUIRED HYPOTHYROIDISM: Primary | ICD-10-CM

## 2022-09-29 DIAGNOSIS — Z23 NEEDS FLU SHOT: ICD-10-CM

## 2022-09-29 DIAGNOSIS — F33.0 MILD EPISODE OF RECURRENT MAJOR DEPRESSIVE DISORDER (HCC): ICD-10-CM

## 2022-09-29 DIAGNOSIS — E78.2 MIXED HYPERLIPIDEMIA: ICD-10-CM

## 2022-09-29 DIAGNOSIS — E03.9 ACQUIRED HYPOTHYROIDISM: ICD-10-CM

## 2022-09-29 DIAGNOSIS — N39.498 OTHER URINARY INCONTINENCE: ICD-10-CM

## 2022-09-29 DIAGNOSIS — R11.2 NAUSEA AND VOMITING, UNSPECIFIED VOMITING TYPE: ICD-10-CM

## 2022-09-29 DIAGNOSIS — M54.16 LUMBAR RADICULOPATHY: ICD-10-CM

## 2022-09-29 PROCEDURE — 90694 VACC AIIV4 NO PRSRV 0.5ML IM: CPT | Performed by: INTERNAL MEDICINE

## 2022-09-29 PROCEDURE — 72148 MRI LUMBAR SPINE W/O DYE: CPT

## 2022-09-29 PROCEDURE — G8399 PT W/DXA RESULTS DOCUMENT: HCPCS | Performed by: INTERNAL MEDICINE

## 2022-09-29 PROCEDURE — G8417 CALC BMI ABV UP PARAM F/U: HCPCS | Performed by: INTERNAL MEDICINE

## 2022-09-29 PROCEDURE — 1101F PT FALLS ASSESS-DOCD LE1/YR: CPT | Performed by: INTERNAL MEDICINE

## 2022-09-29 PROCEDURE — G8427 DOCREV CUR MEDS BY ELIG CLIN: HCPCS | Performed by: INTERNAL MEDICINE

## 2022-09-29 PROCEDURE — G0008 ADMIN INFLUENZA VIRUS VAC: HCPCS | Performed by: INTERNAL MEDICINE

## 2022-09-29 PROCEDURE — G8536 NO DOC ELDER MAL SCRN: HCPCS | Performed by: INTERNAL MEDICINE

## 2022-09-29 PROCEDURE — 1090F PRES/ABSN URINE INCON ASSESS: CPT | Performed by: INTERNAL MEDICINE

## 2022-09-29 PROCEDURE — 99214 OFFICE O/P EST MOD 30 MIN: CPT | Performed by: INTERNAL MEDICINE

## 2022-09-29 PROCEDURE — G9717 DOC PT DX DEP/BP F/U NT REQ: HCPCS | Performed by: INTERNAL MEDICINE

## 2022-09-29 PROCEDURE — 3017F COLORECTAL CA SCREEN DOC REV: CPT | Performed by: INTERNAL MEDICINE

## 2022-09-29 PROCEDURE — G0463 HOSPITAL OUTPT CLINIC VISIT: HCPCS | Performed by: INTERNAL MEDICINE

## 2022-09-29 RX ORDER — LEVOTHYROXINE SODIUM 175 UG/1
TABLET ORAL
Qty: 90 TABLET | Refills: 1 | Status: SHIPPED | OUTPATIENT
Start: 2022-09-29

## 2022-09-29 RX ORDER — TOLTERODINE 4 MG/1
4 CAPSULE, EXTENDED RELEASE ORAL DAILY
Qty: 90 CAPSULE | Refills: 1 | Status: SHIPPED | OUTPATIENT
Start: 2022-09-29

## 2022-09-29 RX ORDER — ATORVASTATIN CALCIUM 10 MG/1
TABLET, FILM COATED ORAL
Qty: 90 TABLET | Refills: 1 | Status: SHIPPED | OUTPATIENT
Start: 2022-09-29

## 2022-09-29 RX ORDER — ESCITALOPRAM OXALATE 20 MG/1
TABLET ORAL
Qty: 90 TABLET | Refills: 1 | Status: SHIPPED | OUTPATIENT
Start: 2022-09-29

## 2022-09-30 LAB
ALBUMIN SERPL-MCNC: 3.9 G/DL (ref 3.5–5)
ALBUMIN/GLOB SERPL: 1.3 {RATIO} (ref 1.1–2.2)
ALP SERPL-CCNC: 106 U/L (ref 45–117)
ALT SERPL-CCNC: 24 U/L (ref 12–78)
ANION GAP SERPL CALC-SCNC: 1 MMOL/L (ref 5–15)
AST SERPL-CCNC: 15 U/L (ref 15–37)
BILIRUB SERPL-MCNC: 1.2 MG/DL (ref 0.2–1)
BUN SERPL-MCNC: 14 MG/DL (ref 6–20)
BUN/CREAT SERPL: 16 (ref 12–20)
CALCIUM SERPL-MCNC: 9.5 MG/DL (ref 8.5–10.1)
CHLORIDE SERPL-SCNC: 105 MMOL/L (ref 97–108)
CHOLEST SERPL-MCNC: 205 MG/DL
CO2 SERPL-SCNC: 32 MMOL/L (ref 21–32)
CREAT SERPL-MCNC: 0.86 MG/DL (ref 0.55–1.02)
ERYTHROCYTE [DISTWIDTH] IN BLOOD BY AUTOMATED COUNT: 12.8 % (ref 11.5–14.5)
GLOBULIN SER CALC-MCNC: 3.1 G/DL (ref 2–4)
GLUCOSE SERPL-MCNC: 88 MG/DL (ref 65–100)
HCT VFR BLD AUTO: 43.1 % (ref 35–47)
HDLC SERPL-MCNC: 76 MG/DL
HDLC SERPL: 2.7 {RATIO} (ref 0–5)
HGB BLD-MCNC: 13.7 G/DL (ref 11.5–16)
LDLC SERPL CALC-MCNC: 104.2 MG/DL (ref 0–100)
MCH RBC QN AUTO: 29.5 PG (ref 26–34)
MCHC RBC AUTO-ENTMCNC: 31.8 G/DL (ref 30–36.5)
MCV RBC AUTO: 92.9 FL (ref 80–99)
NRBC # BLD: 0 K/UL (ref 0–0.01)
NRBC BLD-RTO: 0 PER 100 WBC
PLATELET # BLD AUTO: 264 K/UL (ref 150–400)
PMV BLD AUTO: 10.7 FL (ref 8.9–12.9)
POTASSIUM SERPL-SCNC: 4.5 MMOL/L (ref 3.5–5.1)
PROT SERPL-MCNC: 7 G/DL (ref 6.4–8.2)
RBC # BLD AUTO: 4.64 M/UL (ref 3.8–5.2)
SODIUM SERPL-SCNC: 138 MMOL/L (ref 136–145)
T4 FREE SERPL-MCNC: 1.5 NG/DL (ref 0.8–1.5)
TRIGL SERPL-MCNC: 124 MG/DL (ref ?–150)
TSH SERPL DL<=0.05 MIU/L-ACNC: 0.93 UIU/ML (ref 0.36–3.74)
VLDLC SERPL CALC-MCNC: 24.8 MG/DL
WBC # BLD AUTO: 6.3 K/UL (ref 3.6–11)

## 2022-10-17 ENCOUNTER — TELEPHONE (OUTPATIENT)
Dept: INTERNAL MEDICINE CLINIC | Age: 77
End: 2022-10-17

## 2022-10-17 NOTE — TELEPHONE ENCOUNTER
Pt is calling states she misplaced a number to get schedule for some type of testing. Pt wanted to know if the nurse still had the number.  Please advise

## 2022-11-16 ENCOUNTER — HOSPITAL ENCOUNTER (OUTPATIENT)
Dept: GENERAL RADIOLOGY | Age: 77
Discharge: HOME | End: 2022-11-16
Attending: INTERNAL MEDICINE
Payer: MEDICARE

## 2022-11-16 DIAGNOSIS — R11.2 NAUSEA AND VOMITING, UNSPECIFIED VOMITING TYPE: ICD-10-CM

## 2022-11-16 PROCEDURE — 74220 X-RAY XM ESOPHAGUS 1CNTRST: CPT

## 2023-01-12 LAB
ALBUMIN SERPL-MCNC: 4.4 G/DL (ref 3.7–4.7)
ALBUMIN/GLOB SERPL: 2.2 {RATIO} (ref 1.2–2.2)
ALP SERPL-CCNC: 121 IU/L (ref 44–121)
ALT SERPL-CCNC: 12 IU/L (ref 0–32)
AST SERPL-CCNC: 17 IU/L (ref 0–40)
BILIRUB SERPL-MCNC: 0.7 MG/DL (ref 0–1.2)
BUN SERPL-MCNC: 11 MG/DL (ref 8–27)
BUN/CREAT SERPL: 14 (ref 12–28)
CALCIUM SERPL-MCNC: 9.1 MG/DL (ref 8.7–10.3)
CHLORIDE SERPL-SCNC: 102 MMOL/L (ref 96–106)
CHOLEST SERPL-MCNC: 170 MG/DL (ref 100–199)
CO2 SERPL-SCNC: 29 MMOL/L (ref 20–29)
CREAT SERPL-MCNC: 0.76 MG/DL (ref 0.57–1)
EGFR: 82 ML/MIN/1.73
ERYTHROCYTE [DISTWIDTH] IN BLOOD BY AUTOMATED COUNT: 12.6 % (ref 11.7–15.4)
GLOBULIN SER CALC-MCNC: 2 G/DL (ref 1.5–4.5)
GLUCOSE SERPL-MCNC: 91 MG/DL (ref 70–99)
HCT VFR BLD AUTO: 40.6 % (ref 34–46.6)
HDLC SERPL-MCNC: 61 MG/DL
HGB BLD-MCNC: 13.4 G/DL (ref 11.1–15.9)
IMP & REVIEW OF LAB RESULTS: NORMAL
LDLC SERPL CALC-MCNC: 88 MG/DL (ref 0–99)
MCH RBC QN AUTO: 29.5 PG (ref 26.6–33)
MCHC RBC AUTO-ENTMCNC: 33 G/DL (ref 31.5–35.7)
MCV RBC AUTO: 89 FL (ref 79–97)
PLATELET # BLD AUTO: 262 X10E3/UL (ref 150–450)
POTASSIUM SERPL-SCNC: 4.5 MMOL/L (ref 3.5–5.2)
PROT SERPL-MCNC: 6.4 G/DL (ref 6–8.5)
RBC # BLD AUTO: 4.55 X10E6/UL (ref 3.77–5.28)
SODIUM SERPL-SCNC: 141 MMOL/L (ref 134–144)
T4 FREE SERPL-MCNC: 1.62 NG/DL (ref 0.82–1.77)
TRIGL SERPL-MCNC: 118 MG/DL (ref 0–149)
TSH SERPL DL<=0.005 MIU/L-ACNC: 2.58 UIU/ML (ref 0.45–4.5)
VLDLC SERPL CALC-MCNC: 21 MG/DL (ref 5–40)
WBC # BLD AUTO: 6.5 X10E3/UL (ref 3.4–10.8)

## 2023-01-25 RX ORDER — DESONIDE 0.5 MG/G
OINTMENT TOPICAL 2 TIMES DAILY
Qty: 15 G | Refills: 0 | Status: SHIPPED | OUTPATIENT
Start: 2023-01-25

## 2023-05-04 ENCOUNTER — OFFICE VISIT (OUTPATIENT)
Dept: INTERNAL MEDICINE CLINIC | Age: 78
End: 2023-05-04
Payer: MEDICARE

## 2023-05-04 VITALS
BODY MASS INDEX: 35.92 KG/M2 | RESPIRATION RATE: 16 BRPM | TEMPERATURE: 98.7 F | OXYGEN SATURATION: 98 % | WEIGHT: 215.6 LBS | HEART RATE: 65 BPM | HEIGHT: 65 IN | DIASTOLIC BLOOD PRESSURE: 68 MMHG | SYSTOLIC BLOOD PRESSURE: 139 MMHG

## 2023-05-04 DIAGNOSIS — F33.0 MILD EPISODE OF RECURRENT MAJOR DEPRESSIVE DISORDER (HCC): ICD-10-CM

## 2023-05-04 DIAGNOSIS — E66.01 SEVERE OBESITY (BMI 35.0-39.9) WITH COMORBIDITY (HCC): ICD-10-CM

## 2023-05-04 DIAGNOSIS — M25.562 CHRONIC PAIN OF LEFT KNEE: ICD-10-CM

## 2023-05-04 DIAGNOSIS — E03.9 ACQUIRED HYPOTHYROIDISM: ICD-10-CM

## 2023-05-04 DIAGNOSIS — N39.498 OTHER URINARY INCONTINENCE: ICD-10-CM

## 2023-05-04 DIAGNOSIS — Z12.31 VISIT FOR SCREENING MAMMOGRAM: ICD-10-CM

## 2023-05-04 DIAGNOSIS — K21.9 GASTROESOPHAGEAL REFLUX DISEASE WITHOUT ESOPHAGITIS: ICD-10-CM

## 2023-05-04 DIAGNOSIS — S09.90XA INJURY OF HEAD, INITIAL ENCOUNTER: Primary | ICD-10-CM

## 2023-05-04 DIAGNOSIS — G89.29 CHRONIC PAIN OF LEFT KNEE: ICD-10-CM

## 2023-05-04 DIAGNOSIS — E78.2 MIXED HYPERLIPIDEMIA: ICD-10-CM

## 2023-05-04 PROCEDURE — G8536 NO DOC ELDER MAL SCRN: HCPCS | Performed by: INTERNAL MEDICINE

## 2023-05-04 PROCEDURE — 99214 OFFICE O/P EST MOD 30 MIN: CPT | Performed by: INTERNAL MEDICINE

## 2023-05-04 PROCEDURE — G8427 DOCREV CUR MEDS BY ELIG CLIN: HCPCS | Performed by: INTERNAL MEDICINE

## 2023-05-04 PROCEDURE — G9717 DOC PT DX DEP/BP F/U NT REQ: HCPCS | Performed by: INTERNAL MEDICINE

## 2023-05-04 PROCEDURE — 1090F PRES/ABSN URINE INCON ASSESS: CPT | Performed by: INTERNAL MEDICINE

## 2023-05-04 PROCEDURE — G0463 HOSPITAL OUTPT CLINIC VISIT: HCPCS | Performed by: INTERNAL MEDICINE

## 2023-05-04 PROCEDURE — 3017F COLORECTAL CA SCREEN DOC REV: CPT | Performed by: INTERNAL MEDICINE

## 2023-05-04 PROCEDURE — G8417 CALC BMI ABV UP PARAM F/U: HCPCS | Performed by: INTERNAL MEDICINE

## 2023-05-04 PROCEDURE — G8399 PT W/DXA RESULTS DOCUMENT: HCPCS | Performed by: INTERNAL MEDICINE

## 2023-05-04 PROCEDURE — 1101F PT FALLS ASSESS-DOCD LE1/YR: CPT | Performed by: INTERNAL MEDICINE

## 2023-05-04 RX ORDER — CIPROFLOXACIN AND DEXAMETHASONE 3; 1 MG/ML; MG/ML
4 SUSPENSION/ DROPS AURICULAR (OTIC) 2 TIMES DAILY
Qty: 7.5 ML | Refills: 3 | Status: SHIPPED | OUTPATIENT
Start: 2023-05-04

## 2023-05-12 ENCOUNTER — TRANSCRIBE ORDERS (OUTPATIENT)
Facility: HOSPITAL | Age: 78
End: 2023-05-12

## 2023-05-12 DIAGNOSIS — M17.12 PRIMARY OSTEOARTHRITIS OF LEFT KNEE: Primary | ICD-10-CM

## 2023-05-22 ENCOUNTER — HOSPITAL ENCOUNTER (OUTPATIENT)
Facility: HOSPITAL | Age: 78
Discharge: HOME | End: 2023-05-25
Payer: MEDICARE

## 2023-05-22 DIAGNOSIS — Z12.31 VISIT FOR SCREENING MAMMOGRAM: ICD-10-CM

## 2023-05-22 PROCEDURE — 77067 SCR MAMMO BI INCL CAD: CPT

## 2023-08-08 ENCOUNTER — TELEPHONE (OUTPATIENT)
Age: 78
End: 2023-08-08

## 2023-08-08 RX ORDER — ATORVASTATIN CALCIUM 10 MG/1
10 TABLET, FILM COATED ORAL DAILY
Qty: 90 TABLET | Refills: 1 | Status: SHIPPED | OUTPATIENT
Start: 2023-08-08

## 2023-08-08 RX ORDER — DESONIDE 0.5 MG/G
OINTMENT TOPICAL 2 TIMES DAILY
Qty: 30 G | Refills: 1 | Status: SHIPPED | OUTPATIENT
Start: 2023-08-08

## 2023-08-08 RX ORDER — LEVOTHYROXINE SODIUM 175 UG/1
TABLET ORAL
Qty: 90 TABLET | Refills: 1 | Status: SHIPPED | OUTPATIENT
Start: 2023-08-08

## 2023-08-08 RX ORDER — TOLTERODINE 4 MG/1
4 CAPSULE, EXTENDED RELEASE ORAL DAILY
Qty: 90 CAPSULE | Refills: 1 | Status: SHIPPED | OUTPATIENT
Start: 2023-08-08

## 2023-08-08 RX ORDER — ESCITALOPRAM OXALATE 20 MG/1
20 TABLET ORAL DAILY
Qty: 90 TABLET | Refills: 1 | Status: SHIPPED | OUTPATIENT
Start: 2023-08-08

## 2023-08-08 NOTE — TELEPHONE ENCOUNTER
Medication refill    evothyroxine 175 mcg    escitalopram 20 mg    atorvastatin 10 mg    tolterodine er 4 mg cap    desonide ointment . 05    Send to   [address] phone [phone]

## 2024-01-05 ENCOUNTER — TELEPHONE (OUTPATIENT)
Age: 79
End: 2024-01-05

## 2024-01-05 DIAGNOSIS — E78.5 HYPERLIPIDEMIA LDL GOAL <100: ICD-10-CM

## 2024-01-05 DIAGNOSIS — E03.9 ACQUIRED HYPOTHYROIDISM: ICD-10-CM

## 2024-01-05 DIAGNOSIS — E78.5 HYPERLIPIDEMIA LDL GOAL <100: Primary | ICD-10-CM

## 2024-01-05 NOTE — TELEPHONE ENCOUNTER
Patient is calling requesting lab orders to be sent to her address so she can get these labs done before her appt on Jan. 18th. Please advise

## 2024-01-11 LAB
ALBUMIN SERPL-MCNC: 4.4 G/DL (ref 3.8–4.8)
ALBUMIN/GLOB SERPL: 1.9 {RATIO} (ref 1.2–2.2)
ALP SERPL-CCNC: 116 IU/L (ref 44–121)
ALT SERPL-CCNC: 12 IU/L (ref 0–32)
AST SERPL-CCNC: 14 IU/L (ref 0–40)
BILIRUB SERPL-MCNC: 0.6 MG/DL (ref 0–1.2)
BUN SERPL-MCNC: 9 MG/DL (ref 8–27)
BUN/CREAT SERPL: 12 (ref 12–28)
CALCIUM SERPL-MCNC: 9 MG/DL (ref 8.7–10.3)
CHLORIDE SERPL-SCNC: 102 MMOL/L (ref 96–106)
CHOLEST SERPL-MCNC: 174 MG/DL (ref 100–199)
CO2 SERPL-SCNC: 24 MMOL/L (ref 20–29)
CREAT SERPL-MCNC: 0.74 MG/DL (ref 0.57–1)
EGFRCR SERPLBLD CKD-EPI 2021: 84 ML/MIN/1.73
ERYTHROCYTE [DISTWIDTH] IN BLOOD BY AUTOMATED COUNT: 12.1 % (ref 11.7–15.4)
GLOBULIN SER CALC-MCNC: 2.3 G/DL (ref 1.5–4.5)
GLUCOSE SERPL-MCNC: 94 MG/DL (ref 70–99)
HCT VFR BLD AUTO: 40.4 % (ref 34–46.6)
HDLC SERPL-MCNC: 59 MG/DL
HGB BLD-MCNC: 13.5 G/DL (ref 11.1–15.9)
IMP & REVIEW OF LAB RESULTS: NORMAL
LDLC SERPL CALC-MCNC: 83 MG/DL (ref 0–99)
MCH RBC QN AUTO: 29.7 PG (ref 26.6–33)
MCHC RBC AUTO-ENTMCNC: 33.4 G/DL (ref 31.5–35.7)
MCV RBC AUTO: 89 FL (ref 79–97)
PLATELET # BLD AUTO: 321 X10E3/UL (ref 150–450)
POTASSIUM SERPL-SCNC: 4.1 MMOL/L (ref 3.5–5.2)
PROT SERPL-MCNC: 6.7 G/DL (ref 6–8.5)
RBC # BLD AUTO: 4.55 X10E6/UL (ref 3.77–5.28)
SODIUM SERPL-SCNC: 142 MMOL/L (ref 134–144)
T4 FREE SERPL-MCNC: 1.89 NG/DL (ref 0.82–1.77)
TRIGL SERPL-MCNC: 192 MG/DL (ref 0–149)
TSH SERPL DL<=0.005 MIU/L-ACNC: 0.49 UIU/ML (ref 0.45–4.5)
VLDLC SERPL CALC-MCNC: 32 MG/DL (ref 5–40)
WBC # BLD AUTO: 8.4 X10E3/UL (ref 3.4–10.8)

## 2024-01-16 NOTE — PROGRESS NOTES
Delphine Grimes (:  1945) is a 78 y.o. female,Established patient, here for evaluation of the following chief complaint(s):  Medicare AWV         ASSESSMENT/PLAN:  1. Medicare annual wellness visit, subsequent  2. Acquired hypothyroidism-continue current dose of levothyroxine  3. Hyperlipidemia LDL goal <100-tolerating Lipitor  4. Major depressive disorder, recurrent, mild (HCC)-continue with Lexapro  5. Other specified urinary incontinence-continue with Detrol  6. Gastro-esophageal reflux disease without esophagitis-tolerating her omeprazole  7. Pain and swelling of left knee-this was replaced and doing much better  8. Chronic bilateral low back pain, unspecified whether sciatica present-this is much better after having her knee replaced as her alignment is more normal      Return for Medicare Annual Wellness Visit in 1 year.       Levothyroxine  Lipitor  Lexapro   Detrol  ? Tx   ? Doing   PT/injection?  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 female here for follow up of hypothyroidism.    Lab Results   Component Value Date/Time    TSH 0.487 01/10/2024 03:31 PM     Thyroid ROS: denies fatigue, weight changes, heat/cold intolerance, bowel/skin changes or CVS symptoms.            Subjective:    Delphine Grimes is a 77 y.o. female with hyperlipidemia.       Cardiovascular risk analysis - 77 y.o. female LDL goal is under 100.    ROS: taking medications as instructed, no medication side effects noted, no TIA's, no chest pain on exertion, no dyspnea on exertion, no swelling of ankles. Tolerating meds, no myalgias or other side effects noted   New concerns: tolerating medicine   Lab Results   Component Value Date    CHOL 174 01/10/2024     Lab Results   Component Value Date    TRIG 192 (H) 01/10/2024     Lab Results   Component Value Date    HDL 59 01/10/2024     Lab Results   Component Value Date    LDLCALC 83 01/10/2024

## 2024-01-17 PROBLEM — F33.1 MAJOR DEPRESSIVE DISORDER, RECURRENT, MODERATE (HCC): Status: RESOLVED | Noted: 2022-03-23 | Resolved: 2024-01-17

## 2024-01-18 ENCOUNTER — OFFICE VISIT (OUTPATIENT)
Age: 79
End: 2024-01-18
Payer: MEDICARE

## 2024-01-18 VITALS
HEIGHT: 65 IN | TEMPERATURE: 98.3 F | BODY MASS INDEX: 36.15 KG/M2 | HEART RATE: 72 BPM | DIASTOLIC BLOOD PRESSURE: 77 MMHG | WEIGHT: 217 LBS | SYSTOLIC BLOOD PRESSURE: 145 MMHG | OXYGEN SATURATION: 96 % | RESPIRATION RATE: 16 BRPM

## 2024-01-18 DIAGNOSIS — M25.562 PAIN AND SWELLING OF LEFT KNEE: ICD-10-CM

## 2024-01-18 DIAGNOSIS — M54.50 CHRONIC BILATERAL LOW BACK PAIN, UNSPECIFIED WHETHER SCIATICA PRESENT: ICD-10-CM

## 2024-01-18 DIAGNOSIS — K21.9 GASTRO-ESOPHAGEAL REFLUX DISEASE WITHOUT ESOPHAGITIS: ICD-10-CM

## 2024-01-18 DIAGNOSIS — Z00.00 MEDICARE ANNUAL WELLNESS VISIT, SUBSEQUENT: Primary | ICD-10-CM

## 2024-01-18 DIAGNOSIS — G89.29 CHRONIC BILATERAL LOW BACK PAIN, UNSPECIFIED WHETHER SCIATICA PRESENT: ICD-10-CM

## 2024-01-18 DIAGNOSIS — M25.462 PAIN AND SWELLING OF LEFT KNEE: ICD-10-CM

## 2024-01-18 DIAGNOSIS — N39.498 OTHER SPECIFIED URINARY INCONTINENCE: ICD-10-CM

## 2024-01-18 DIAGNOSIS — F33.0 MAJOR DEPRESSIVE DISORDER, RECURRENT, MILD (HCC): ICD-10-CM

## 2024-01-18 DIAGNOSIS — E03.9 ACQUIRED HYPOTHYROIDISM: ICD-10-CM

## 2024-01-18 DIAGNOSIS — E78.5 HYPERLIPIDEMIA LDL GOAL <100: ICD-10-CM

## 2024-01-18 PROCEDURE — 1090F PRES/ABSN URINE INCON ASSESS: CPT | Performed by: INTERNAL MEDICINE

## 2024-01-18 PROCEDURE — G8427 DOCREV CUR MEDS BY ELIG CLIN: HCPCS | Performed by: INTERNAL MEDICINE

## 2024-01-18 PROCEDURE — 1123F ACP DISCUSS/DSCN MKR DOCD: CPT | Performed by: INTERNAL MEDICINE

## 2024-01-18 PROCEDURE — 0509F URINE INCON PLAN DOCD: CPT | Performed by: INTERNAL MEDICINE

## 2024-01-18 PROCEDURE — 99214 OFFICE O/P EST MOD 30 MIN: CPT | Performed by: INTERNAL MEDICINE

## 2024-01-18 PROCEDURE — G8399 PT W/DXA RESULTS DOCUMENT: HCPCS | Performed by: INTERNAL MEDICINE

## 2024-01-18 PROCEDURE — G0439 PPPS, SUBSEQ VISIT: HCPCS | Performed by: INTERNAL MEDICINE

## 2024-01-18 PROCEDURE — G8417 CALC BMI ABV UP PARAM F/U: HCPCS | Performed by: INTERNAL MEDICINE

## 2024-01-18 PROCEDURE — G8484 FLU IMMUNIZE NO ADMIN: HCPCS | Performed by: INTERNAL MEDICINE

## 2024-01-18 PROCEDURE — 4004F PT TOBACCO SCREEN RCVD TLK: CPT | Performed by: INTERNAL MEDICINE

## 2024-01-18 RX ORDER — ESCITALOPRAM OXALATE 20 MG/1
20 TABLET ORAL DAILY
Qty: 90 TABLET | Refills: 3 | Status: SHIPPED | OUTPATIENT
Start: 2024-01-18

## 2024-01-18 RX ORDER — ATORVASTATIN CALCIUM 10 MG/1
10 TABLET, FILM COATED ORAL DAILY
Qty: 90 TABLET | Refills: 3 | Status: SHIPPED | OUTPATIENT
Start: 2024-01-18

## 2024-01-18 RX ORDER — OMEPRAZOLE 20 MG/1
20 CAPSULE, DELAYED RELEASE ORAL
Qty: 90 CAPSULE | Refills: 3 | Status: SHIPPED | OUTPATIENT
Start: 2024-01-18

## 2024-01-18 RX ORDER — TOLTERODINE 4 MG/1
4 CAPSULE, EXTENDED RELEASE ORAL DAILY
Qty: 90 CAPSULE | Refills: 3 | Status: SHIPPED | OUTPATIENT
Start: 2024-01-18

## 2024-01-18 RX ORDER — LEVOTHYROXINE SODIUM 175 UG/1
TABLET ORAL
Qty: 90 TABLET | Refills: 3 | Status: SHIPPED | OUTPATIENT
Start: 2024-01-18

## 2024-01-18 ASSESSMENT — PATIENT HEALTH QUESTIONNAIRE - PHQ9
SUM OF ALL RESPONSES TO PHQ9 QUESTIONS 1 & 2: 0
6. FEELING BAD ABOUT YOURSELF - OR THAT YOU ARE A FAILURE OR HAVE LET YOURSELF OR YOUR FAMILY DOWN: 0
7. TROUBLE CONCENTRATING ON THINGS, SUCH AS READING THE NEWSPAPER OR WATCHING TELEVISION: 0
4. FEELING TIRED OR HAVING LITTLE ENERGY: 0
2. FEELING DOWN, DEPRESSED OR HOPELESS: 0
SUM OF ALL RESPONSES TO PHQ QUESTIONS 1-9: 0
5. POOR APPETITE OR OVEREATING: 0
8. MOVING OR SPEAKING SO SLOWLY THAT OTHER PEOPLE COULD HAVE NOTICED. OR THE OPPOSITE, BEING SO FIGETY OR RESTLESS THAT YOU HAVE BEEN MOVING AROUND A LOT MORE THAN USUAL: 0
3. TROUBLE FALLING OR STAYING ASLEEP: 0
4. FEELING TIRED OR HAVING LITTLE ENERGY: 0
5. POOR APPETITE OR OVEREATING: 0
SUM OF ALL RESPONSES TO PHQ QUESTIONS 1-9: 0
2. FEELING DOWN, DEPRESSED OR HOPELESS: 0
3. TROUBLE FALLING OR STAYING ASLEEP: 0
SUM OF ALL RESPONSES TO PHQ QUESTIONS 1-9: 0
SUM OF ALL RESPONSES TO PHQ QUESTIONS 1-9: 0
7. TROUBLE CONCENTRATING ON THINGS, SUCH AS READING THE NEWSPAPER OR WATCHING TELEVISION: 0
9. THOUGHTS THAT YOU WOULD BE BETTER OFF DEAD, OR OF HURTING YOURSELF: 0
SUM OF ALL RESPONSES TO PHQ QUESTIONS 1-9: 0
6. FEELING BAD ABOUT YOURSELF - OR THAT YOU ARE A FAILURE OR HAVE LET YOURSELF OR YOUR FAMILY DOWN: 0
SUM OF ALL RESPONSES TO PHQ QUESTIONS 1-9: 0
1. LITTLE INTEREST OR PLEASURE IN DOING THINGS: 0

## 2024-01-18 ASSESSMENT — LIFESTYLE VARIABLES
HOW OFTEN DO YOU HAVE A DRINK CONTAINING ALCOHOL: NEVER
HOW MANY STANDARD DRINKS CONTAINING ALCOHOL DO YOU HAVE ON A TYPICAL DAY: PATIENT DOES NOT DRINK

## 2024-01-18 NOTE — PROGRESS NOTES
Patient identified with two identification factors, Name and Date of Birth.    Chief Complaint   Patient presents with    Medicare AWV     Patient has multiple concerns today. Patient states she would like to discuss cough, weight management, medication refills and labs.  BP (!) 145/77   Pulse 72   Temp 98.3 °F (36.8 °C) (Oral)   Resp 16   Ht 1.651 m (5' 5\")   Wt 98.4 kg (217 lb)   SpO2 96%   BMI 36.11 kg/m²     Health Maintenance Due   Topic    Hepatitis C screen     Respiratory Syncytial Virus (RSV) Pregnant or age 60 yrs+ (1 - 1-dose 60+ series)    Hepatitis B vaccine (3 of 3 - 19+ 3-dose series)    COVID-19 Vaccine (6 - 2023-24 season)    Annual Wellness Visit (Medicare)         1. \"Have you been to the ER, urgent care clinic since your last visit?  Hospitalized since your last visit?\" no    2. \"Have you seen or consulted any other health care providers outside of the [facility] since your last visit?\" no

## 2024-05-08 ENCOUNTER — OFFICE VISIT (OUTPATIENT)
Age: 79
End: 2024-05-08
Payer: MEDICARE

## 2024-05-08 VITALS
DIASTOLIC BLOOD PRESSURE: 66 MMHG | OXYGEN SATURATION: 94 % | RESPIRATION RATE: 14 BRPM | WEIGHT: 216.8 LBS | BODY MASS INDEX: 36.12 KG/M2 | HEIGHT: 65 IN | HEART RATE: 88 BPM | SYSTOLIC BLOOD PRESSURE: 144 MMHG

## 2024-05-08 DIAGNOSIS — M54.2 NECK PAIN ON LEFT SIDE: Primary | ICD-10-CM

## 2024-05-08 PROCEDURE — 1090F PRES/ABSN URINE INCON ASSESS: CPT | Performed by: INTERNAL MEDICINE

## 2024-05-08 PROCEDURE — 4004F PT TOBACCO SCREEN RCVD TLK: CPT | Performed by: INTERNAL MEDICINE

## 2024-05-08 PROCEDURE — 99213 OFFICE O/P EST LOW 20 MIN: CPT | Performed by: INTERNAL MEDICINE

## 2024-05-08 PROCEDURE — G8427 DOCREV CUR MEDS BY ELIG CLIN: HCPCS | Performed by: INTERNAL MEDICINE

## 2024-05-08 PROCEDURE — G8399 PT W/DXA RESULTS DOCUMENT: HCPCS | Performed by: INTERNAL MEDICINE

## 2024-05-08 PROCEDURE — G8417 CALC BMI ABV UP PARAM F/U: HCPCS | Performed by: INTERNAL MEDICINE

## 2024-05-08 PROCEDURE — 1123F ACP DISCUSS/DSCN MKR DOCD: CPT | Performed by: INTERNAL MEDICINE

## 2024-05-08 RX ORDER — ACYCLOVIR 800 MG/1
TABLET ORAL
Qty: 35 TABLET | Refills: 0 | Status: CANCELLED | OUTPATIENT
Start: 2024-05-08

## 2024-05-08 RX ORDER — PREDNISONE 20 MG/1
TABLET ORAL
COMMUNITY
Start: 2024-04-22

## 2024-05-08 RX ORDER — ACYCLOVIR 800 MG/1
TABLET ORAL
COMMUNITY
Start: 2024-04-22

## 2024-05-08 ASSESSMENT — PATIENT HEALTH QUESTIONNAIRE - PHQ9
SUM OF ALL RESPONSES TO PHQ9 QUESTIONS 1 & 2: 2
SUM OF ALL RESPONSES TO PHQ QUESTIONS 1-9: 2
1. LITTLE INTEREST OR PLEASURE IN DOING THINGS: SEVERAL DAYS
SUM OF ALL RESPONSES TO PHQ QUESTIONS 1-9: 2
2. FEELING DOWN, DEPRESSED OR HOPELESS: SEVERAL DAYS
SUM OF ALL RESPONSES TO PHQ QUESTIONS 1-9: 2
SUM OF ALL RESPONSES TO PHQ QUESTIONS 1-9: 2

## 2024-05-08 NOTE — PATIENT INSTRUCTIONS
Take Prilosec daily (30-60 minutes) before eating. Also take ibuprofen 800 mg twice daily, with meals as needed.

## 2024-05-08 NOTE — PROGRESS NOTES
A/P:    1. Neck pain on left side  We discussed potential cervical spinal etiology for her pain, given the appearance of her facet arthropathy on CT in 2023.  I told her it would be unusual for herpes zoster rash to resolve so quickly, but not impossible.  She still has some of the ibuprofen 800 mg given by urgent care, so she will take this, with food for the next few days.  She will resume taking omeprazole regularly while on the ibuprofen.         Follow up:  prn         An electronic signature was used to authenticate this note.    --Camila Kendall MD         HPI: Delphine Grimes is here for an acute visit.      Shingles: [facility] in her neck starting that week, on 4/22/24. When she was there, they saw rash and told her it was shingles. She was given prednisone 60 mg x 7 days, ibuprofen 800 mg to take prn and acyclovir. The pain resolved and then started again about three days ago, radiating up to the neck. Ms. Grimes has had shingles before and had the last two Shingrix vaccines were in 2019.     Of note, CT cervical spine showed facet arthropathy.      Current Outpatient Medications:     acyclovir (ZOVIRAX) 800 MG tablet, TAKE ONE TABLET BY MOUTH EVERY 4 HOURS while awake (max of FIVE tabs PER DAY), Disp: , Rfl:     predniSONE (DELTASONE) 20 MG tablet, , Disp: , Rfl:     atorvastatin (LIPITOR) 10 MG tablet, Take 1 tablet by mouth daily, Disp: 90 tablet, Rfl: 3    escitalopram (LEXAPRO) 20 MG tablet, Take 1 tablet by mouth daily, Disp: 90 tablet, Rfl: 3    levothyroxine (SYNTHROID) 175 MCG tablet, TAKE ONE TABLET BY MOUTH DAILY BEFORE BREAKFAST, Disp: 90 tablet, Rfl: 3    tolterodine (DETROL LA) 4 MG extended release capsule, Take 1 capsule by mouth daily, Disp: 90 capsule, Rfl: 3    omeprazole (PRILOSEC) 20 MG delayed release capsule, Take 1 capsule by mouth every morning (before breakfast) (Patient not taking: Reported on 5/8/2024), Disp: 90 capsule, Rfl:

## 2024-06-01 ENCOUNTER — PATIENT MESSAGE (OUTPATIENT)
Age: 79
End: 2024-06-01

## 2024-06-01 DIAGNOSIS — E78.5 HYPERLIPIDEMIA LDL GOAL <100: ICD-10-CM

## 2024-06-01 DIAGNOSIS — E03.9 ACQUIRED HYPOTHYROIDISM: Primary | ICD-10-CM

## 2024-06-03 NOTE — TELEPHONE ENCOUNTER
From: Delphine Grimes  To: Dr. Hoda Lugo  Sent: 6/1/2024 3:37 PM EDT  Subject: upcoming appointment    I have been feeling similar to what I did before my Thyroid Surgery (in 2000). (tired, brain fog, crying, mixed emotions).   Could you please look at my labs and advise me if I should wait for my next appointment, or should I make an appointment with a specialist?

## 2024-06-18 LAB
ALBUMIN SERPL-MCNC: 4.3 G/DL (ref 3.8–4.8)
ALP SERPL-CCNC: 107 IU/L (ref 44–121)
ALT SERPL-CCNC: 15 IU/L (ref 0–32)
AST SERPL-CCNC: 20 IU/L (ref 0–40)
BASOPHILS # BLD AUTO: 0.1 X10E3/UL (ref 0–0.2)
BASOPHILS NFR BLD AUTO: 2 %
BILIRUB SERPL-MCNC: 0.8 MG/DL (ref 0–1.2)
BUN SERPL-MCNC: 11 MG/DL (ref 8–27)
BUN/CREAT SERPL: 16 (ref 12–28)
CALCIUM SERPL-MCNC: 9.5 MG/DL (ref 8.7–10.3)
CHLORIDE SERPL-SCNC: 103 MMOL/L (ref 96–106)
CHOLEST SERPL-MCNC: 210 MG/DL (ref 100–199)
CO2 SERPL-SCNC: 26 MMOL/L (ref 20–29)
CREAT SERPL-MCNC: 0.7 MG/DL (ref 0.57–1)
EGFRCR SERPLBLD CKD-EPI 2021: 90 ML/MIN/1.73
EOSINOPHIL # BLD AUTO: 0.1 X10E3/UL (ref 0–0.4)
EOSINOPHIL NFR BLD AUTO: 2 %
ERYTHROCYTE [DISTWIDTH] IN BLOOD BY AUTOMATED COUNT: 12.9 % (ref 11.7–15.4)
GLOBULIN SER CALC-MCNC: 2.2 G/DL (ref 1.5–4.5)
GLUCOSE SERPL-MCNC: 82 MG/DL (ref 70–99)
HCT VFR BLD AUTO: 41.8 % (ref 34–46.6)
HDLC SERPL-MCNC: 63 MG/DL
HGB BLD-MCNC: 13.9 G/DL (ref 11.1–15.9)
IMM GRANULOCYTES # BLD AUTO: 0 X10E3/UL (ref 0–0.1)
IMM GRANULOCYTES NFR BLD AUTO: 0 %
IMP & REVIEW OF LAB RESULTS: NORMAL
LDLC SERPL CALC-MCNC: 127 MG/DL (ref 0–99)
LYMPHOCYTES # BLD AUTO: 2.7 X10E3/UL (ref 0.7–3.1)
LYMPHOCYTES NFR BLD AUTO: 41 %
MCH RBC QN AUTO: 29.9 PG (ref 26.6–33)
MCHC RBC AUTO-ENTMCNC: 33.3 G/DL (ref 31.5–35.7)
MCV RBC AUTO: 90 FL (ref 79–97)
MONOCYTES # BLD AUTO: 0.5 X10E3/UL (ref 0.1–0.9)
MONOCYTES NFR BLD AUTO: 8 %
NEUTROPHILS # BLD AUTO: 3.2 X10E3/UL (ref 1.4–7)
NEUTROPHILS NFR BLD AUTO: 47 %
PLATELET # BLD AUTO: 283 X10E3/UL (ref 150–450)
POTASSIUM SERPL-SCNC: 4.7 MMOL/L (ref 3.5–5.2)
PROT SERPL-MCNC: 6.5 G/DL (ref 6–8.5)
RBC # BLD AUTO: 4.65 X10E6/UL (ref 3.77–5.28)
SODIUM SERPL-SCNC: 140 MMOL/L (ref 134–144)
T4 FREE SERPL-MCNC: 1.47 NG/DL (ref 0.82–1.77)
TRIGL SERPL-MCNC: 111 MG/DL (ref 0–149)
TSH SERPL DL<=0.005 MIU/L-ACNC: 1.45 UIU/ML (ref 0.45–4.5)
VLDLC SERPL CALC-MCNC: 20 MG/DL (ref 5–40)
WBC # BLD AUTO: 6.7 X10E3/UL (ref 3.4–10.8)

## 2024-07-17 NOTE — PROGRESS NOTES
Delphine Grimes (:  1945) is a 78 y.o. female,Established patient, here for evaluation of the following chief complaint(s):  Hypothyroidism (Pt states she wants to discuss her lab results ), Cough (Pt states non productive cough started 1-2 months at bed time ), Ear Problem (Pt states ears feel dry ), and Memory Loss (Pt states she has noticed memory loss. Pt states her  has noticed memory issue)      Assessment & Plan   1. Acquired hypothyroidism  2. Hyperlipidemia LDL goal <100  3. Major depressive disorder, recurrent, mild (HCC)  4. Other specified urinary incontinence  5. Gastro-esophageal reflux disease without esophagitis  6. Chronic bilateral low back pain, unspecified whether sciatica present  7. Chronic cough  -     XR CHEST (2 VW); Future  8. Memory change  -     [facility] - Rosie Suarez PsyD, [facility] ([address])  Levothyroxine  Lipitor  Lexapro   Detrol  Omeprazole   PT/injection?    No follow-ups on file.     Assessment & Plan  1. Cough.  Her cough may be positional in nature, possibly due to drainage. Zyrtec will be prescribed. A chest x-ray will be performed today.    2. Memory loss.  Her short-term memory loss may be age-related. A referral to a neuropsychologist for memory testing has been arranged.    3. Flaky, dry ears.  Application of oil to the ear entrance is recommended.    4. Depression.  Her depression may be affecting her memory. Cont current dose of lexapro but I do think memory testing would help     5. Gastroesophageal reflux disease.  She ran out of the Prilosec and overall has been doing well she knows she can take it as needed and to modify diet    6. Knee pain.  Continuation of pool exercises is recommended.    7. Urinary incontinence.  Detrol will be continued.  She has some slight leakage but nothing acute    Follow-up  A follow-up visit is scheduled for 6 months from now.     Subjective   HPI  History of Present Illness  The patient presents

## 2024-07-18 ENCOUNTER — OFFICE VISIT (OUTPATIENT)
Age: 79
End: 2024-07-18
Payer: MEDICARE

## 2024-07-18 VITALS
HEART RATE: 71 BPM | OXYGEN SATURATION: 96 % | BODY MASS INDEX: 37.09 KG/M2 | TEMPERATURE: 98.5 F | HEIGHT: 65 IN | SYSTOLIC BLOOD PRESSURE: 143 MMHG | RESPIRATION RATE: 12 BRPM | DIASTOLIC BLOOD PRESSURE: 55 MMHG | WEIGHT: 222.6 LBS

## 2024-07-18 DIAGNOSIS — E03.9 ACQUIRED HYPOTHYROIDISM: ICD-10-CM

## 2024-07-18 DIAGNOSIS — N39.498 OTHER SPECIFIED URINARY INCONTINENCE: ICD-10-CM

## 2024-07-18 DIAGNOSIS — K21.9 GASTRO-ESOPHAGEAL REFLUX DISEASE WITHOUT ESOPHAGITIS: ICD-10-CM

## 2024-07-18 DIAGNOSIS — R05.3 CHRONIC COUGH: ICD-10-CM

## 2024-07-18 DIAGNOSIS — E78.5 HYPERLIPIDEMIA LDL GOAL <100: ICD-10-CM

## 2024-07-18 DIAGNOSIS — M54.50 CHRONIC BILATERAL LOW BACK PAIN, UNSPECIFIED WHETHER SCIATICA PRESENT: ICD-10-CM

## 2024-07-18 DIAGNOSIS — G89.29 CHRONIC BILATERAL LOW BACK PAIN, UNSPECIFIED WHETHER SCIATICA PRESENT: ICD-10-CM

## 2024-07-18 DIAGNOSIS — F33.0 MAJOR DEPRESSIVE DISORDER, RECURRENT, MILD (HCC): ICD-10-CM

## 2024-07-18 DIAGNOSIS — R41.3 MEMORY CHANGE: ICD-10-CM

## 2024-07-18 DIAGNOSIS — E03.9 ACQUIRED HYPOTHYROIDISM: Primary | ICD-10-CM

## 2024-07-18 PROCEDURE — 1090F PRES/ABSN URINE INCON ASSESS: CPT | Performed by: INTERNAL MEDICINE

## 2024-07-18 PROCEDURE — G8417 CALC BMI ABV UP PARAM F/U: HCPCS | Performed by: INTERNAL MEDICINE

## 2024-07-18 PROCEDURE — 1036F TOBACCO NON-USER: CPT | Performed by: INTERNAL MEDICINE

## 2024-07-18 PROCEDURE — 99214 OFFICE O/P EST MOD 30 MIN: CPT | Performed by: INTERNAL MEDICINE

## 2024-07-18 PROCEDURE — 0509F URINE INCON PLAN DOCD: CPT | Performed by: INTERNAL MEDICINE

## 2024-07-18 PROCEDURE — G8399 PT W/DXA RESULTS DOCUMENT: HCPCS | Performed by: INTERNAL MEDICINE

## 2024-07-18 PROCEDURE — G8427 DOCREV CUR MEDS BY ELIG CLIN: HCPCS | Performed by: INTERNAL MEDICINE

## 2024-07-18 PROCEDURE — 1123F ACP DISCUSS/DSCN MKR DOCD: CPT | Performed by: INTERNAL MEDICINE

## 2024-07-18 SDOH — ECONOMIC STABILITY: INCOME INSECURITY: HOW HARD IS IT FOR YOU TO PAY FOR THE VERY BASICS LIKE FOOD, HOUSING, MEDICAL CARE, AND HEATING?: NOT HARD AT ALL

## 2024-07-18 SDOH — ECONOMIC STABILITY: HOUSING INSECURITY
IN THE LAST 12 MONTHS, WAS THERE A TIME WHEN YOU DID NOT HAVE A STEADY PLACE TO SLEEP OR SLEPT IN A SHELTER (INCLUDING NOW)?: NO

## 2024-07-18 SDOH — ECONOMIC STABILITY: FOOD INSECURITY: WITHIN THE PAST 12 MONTHS, YOU WORRIED THAT YOUR FOOD WOULD RUN OUT BEFORE YOU GOT MONEY TO BUY MORE.: NEVER TRUE

## 2024-07-18 SDOH — ECONOMIC STABILITY: FOOD INSECURITY: WITHIN THE PAST 12 MONTHS, THE FOOD YOU BOUGHT JUST DIDN'T LAST AND YOU DIDN'T HAVE MONEY TO GET MORE.: NEVER TRUE

## 2024-08-06 ENCOUNTER — TELEPHONE (OUTPATIENT)
Age: 79
End: 2024-08-06

## 2024-08-20 LAB
ALBUMIN SERPL-MCNC: 4 G/DL (ref 3.8–4.8)
ALP SERPL-CCNC: 113 IU/L (ref 44–121)
ALT SERPL-CCNC: 16 IU/L (ref 0–32)
AST SERPL-CCNC: 21 IU/L (ref 0–40)
BILIRUB SERPL-MCNC: 0.6 MG/DL (ref 0–1.2)
BUN SERPL-MCNC: 11 MG/DL (ref 8–27)
BUN/CREAT SERPL: 15 (ref 12–28)
CALCIUM SERPL-MCNC: 9.1 MG/DL (ref 8.7–10.3)
CHLORIDE SERPL-SCNC: 103 MMOL/L (ref 96–106)
CHOLEST SERPL-MCNC: 173 MG/DL (ref 100–199)
CO2 SERPL-SCNC: 25 MMOL/L (ref 20–29)
CREAT SERPL-MCNC: 0.73 MG/DL (ref 0.57–1)
EGFRCR SERPLBLD CKD-EPI 2021: 85 ML/MIN/1.73
ERYTHROCYTE [DISTWIDTH] IN BLOOD BY AUTOMATED COUNT: 11.9 % (ref 11.7–15.4)
GLOBULIN SER CALC-MCNC: 2 G/DL (ref 1.5–4.5)
GLUCOSE SERPL-MCNC: 100 MG/DL (ref 70–99)
HCT VFR BLD AUTO: 38.4 % (ref 34–46.6)
HDLC SERPL-MCNC: 62 MG/DL
HGB BLD-MCNC: 12.8 G/DL (ref 11.1–15.9)
IMP & REVIEW OF LAB RESULTS: NORMAL
LDLC SERPL CALC-MCNC: 96 MG/DL (ref 0–99)
MCH RBC QN AUTO: 30.2 PG (ref 26.6–33)
MCHC RBC AUTO-ENTMCNC: 33.3 G/DL (ref 31.5–35.7)
MCV RBC AUTO: 91 FL (ref 79–97)
PLATELET # BLD AUTO: 227 X10E3/UL (ref 150–450)
POTASSIUM SERPL-SCNC: 4.4 MMOL/L (ref 3.5–5.2)
PROT SERPL-MCNC: 6 G/DL (ref 6–8.5)
RBC # BLD AUTO: 4.24 X10E6/UL (ref 3.77–5.28)
SODIUM SERPL-SCNC: 141 MMOL/L (ref 134–144)
T4 FREE SERPL-MCNC: 1.44 NG/DL (ref 0.82–1.77)
TRIGL SERPL-MCNC: 83 MG/DL (ref 0–149)
TSH SERPL DL<=0.005 MIU/L-ACNC: 2.81 UIU/ML (ref 0.45–4.5)
VLDLC SERPL CALC-MCNC: 15 MG/DL (ref 5–40)
WBC # BLD AUTO: 5.6 X10E3/UL (ref 3.4–10.8)

## 2024-08-21 LAB
HBA1C MFR BLD: 5.9 % (ref 4.8–5.6)
SPECIMEN STATUS REPORT: NORMAL

## 2024-09-24 ENCOUNTER — HOSPITAL ENCOUNTER (OUTPATIENT)
Facility: HOSPITAL | Age: 79
Discharge: HOME | End: 2024-09-27
Attending: INTERNAL MEDICINE
Payer: MEDICARE

## 2024-09-24 VITALS — WEIGHT: 222 LBS | BODY MASS INDEX: 36.99 KG/M2 | HEIGHT: 65 IN

## 2024-09-24 DIAGNOSIS — Z12.31 VISIT FOR SCREENING MAMMOGRAM: ICD-10-CM

## 2024-09-24 PROCEDURE — 77067 SCR MAMMO BI INCL CAD: CPT

## 2025-01-18 NOTE — PROGRESS NOTES
Delphine Grimes (:  1945) is a 79 y.o. female,Established patient, here for evaluation of the following chief complaint(s):  Medicare AWV (Patient is not fasting; patient states she has been landry, having headaches, soreness in ears, nausea)     Diagnosis Orders   1. Medicare annual wellness visit, subsequent        2. Acquired hypothyroidism        3. Hyperlipidemia LDL goal <100        4. Major depressive disorder, recurrent, mild (HCC)        5. Other specified urinary incontinence        6. Gastro-esophageal reflux disease without esophagitis        7. Chronic bilateral low back pain, unspecified whether sciatica present            Levothyroxine  Lipitor  Lexapro   Detrol  Omeprazole   PT/injection?    Assessment & Plan  1. Depression./headaches  Her depressive symptoms are not well-managed, despite being on Lexapro for an extended period. It is crucial to address her mental health concerns, as they can significantly impact her memory and overall cognitive function. She will be referred to Patti Sullivan, a nurse practitioner, for further evaluation and potential medication adjustments. She is scheduled for neuropsychological testing in 2025. An MRI of the brain will be ordered to rule out any organic causes for her symptoms.    2. Ear soreness.  She reports a white film and soreness in her ears for several months. The actual ear canal and eardrum appear normal. She is advised to apply CeraVe ointment to the external part of her ears to manage dryness and cracking.    3. Memory issues.  She reports memory issues and has an appointment for memory testing in 2025. Her thyroid function is normal, and her blood pressure and cholesterol levels are stable. Her GERD is well-managed, and her thyroid levels are within the normal range. These factors suggest that her symptoms are not due to metabolic issues. An MRI of the brain will be ordered to further investigate her memory issues and

## 2025-01-20 SDOH — ECONOMIC STABILITY: FOOD INSECURITY: WITHIN THE PAST 12 MONTHS, YOU WORRIED THAT YOUR FOOD WOULD RUN OUT BEFORE YOU GOT MONEY TO BUY MORE.: NEVER TRUE

## 2025-01-20 SDOH — ECONOMIC STABILITY: FOOD INSECURITY: WITHIN THE PAST 12 MONTHS, THE FOOD YOU BOUGHT JUST DIDN'T LAST AND YOU DIDN'T HAVE MONEY TO GET MORE.: NEVER TRUE

## 2025-01-20 SDOH — HEALTH STABILITY: PHYSICAL HEALTH: ON AVERAGE, HOW MANY DAYS PER WEEK DO YOU ENGAGE IN MODERATE TO STRENUOUS EXERCISE (LIKE A BRISK WALK)?: 0 DAYS

## 2025-01-20 SDOH — ECONOMIC STABILITY: INCOME INSECURITY: IN THE LAST 12 MONTHS, WAS THERE A TIME WHEN YOU WERE NOT ABLE TO PAY THE MORTGAGE OR RENT ON TIME?: NO

## 2025-01-20 SDOH — ECONOMIC STABILITY: TRANSPORTATION INSECURITY
IN THE PAST 12 MONTHS, HAS THE LACK OF TRANSPORTATION KEPT YOU FROM MEDICAL APPOINTMENTS OR FROM GETTING MEDICATIONS?: NO

## 2025-01-20 SDOH — ECONOMIC STABILITY: TRANSPORTATION INSECURITY
IN THE PAST 12 MONTHS, HAS LACK OF TRANSPORTATION KEPT YOU FROM MEETINGS, WORK, OR FROM GETTING THINGS NEEDED FOR DAILY LIVING?: NO

## 2025-01-20 ASSESSMENT — PATIENT HEALTH QUESTIONNAIRE - PHQ9
1. LITTLE INTEREST OR PLEASURE IN DOING THINGS: SEVERAL DAYS
SUM OF ALL RESPONSES TO PHQ QUESTIONS 1-9: 1
SUM OF ALL RESPONSES TO PHQ QUESTIONS 1-9: 1
2. FEELING DOWN, DEPRESSED OR HOPELESS: NOT AT ALL
SUM OF ALL RESPONSES TO PHQ9 QUESTIONS 1 & 2: 1
SUM OF ALL RESPONSES TO PHQ QUESTIONS 1-9: 1
SUM OF ALL RESPONSES TO PHQ QUESTIONS 1-9: 1

## 2025-01-20 ASSESSMENT — LIFESTYLE VARIABLES
HOW OFTEN DO YOU HAVE SIX OR MORE DRINKS ON ONE OCCASION: 1
HOW OFTEN DO YOU HAVE A DRINK CONTAINING ALCOHOL: 2
HOW MANY STANDARD DRINKS CONTAINING ALCOHOL DO YOU HAVE ON A TYPICAL DAY: 1 OR 2
HOW OFTEN DO YOU HAVE A DRINK CONTAINING ALCOHOL: MONTHLY OR LESS
HOW MANY STANDARD DRINKS CONTAINING ALCOHOL DO YOU HAVE ON A TYPICAL DAY: 1

## 2025-01-21 ENCOUNTER — OFFICE VISIT (OUTPATIENT)
Facility: CLINIC | Age: 80
End: 2025-01-21
Payer: MEDICARE

## 2025-01-21 VITALS
HEART RATE: 77 BPM | WEIGHT: 223.8 LBS | BODY MASS INDEX: 37.29 KG/M2 | TEMPERATURE: 97.9 F | OXYGEN SATURATION: 96 % | RESPIRATION RATE: 16 BRPM | SYSTOLIC BLOOD PRESSURE: 133 MMHG | HEIGHT: 65 IN | DIASTOLIC BLOOD PRESSURE: 83 MMHG

## 2025-01-21 DIAGNOSIS — E03.9 ACQUIRED HYPOTHYROIDISM: ICD-10-CM

## 2025-01-21 DIAGNOSIS — Z00.00 MEDICARE ANNUAL WELLNESS VISIT, SUBSEQUENT: Primary | ICD-10-CM

## 2025-01-21 DIAGNOSIS — R41.89 COGNITIVE CHANGE: ICD-10-CM

## 2025-01-21 DIAGNOSIS — E78.5 HYPERLIPIDEMIA LDL GOAL <100: ICD-10-CM

## 2025-01-21 DIAGNOSIS — N39.498 OTHER SPECIFIED URINARY INCONTINENCE: ICD-10-CM

## 2025-01-21 DIAGNOSIS — K21.9 GASTRO-ESOPHAGEAL REFLUX DISEASE WITHOUT ESOPHAGITIS: ICD-10-CM

## 2025-01-21 DIAGNOSIS — F33.0 MAJOR DEPRESSIVE DISORDER, RECURRENT, MILD (HCC): ICD-10-CM

## 2025-01-21 DIAGNOSIS — G89.29 CHRONIC BILATERAL LOW BACK PAIN, UNSPECIFIED WHETHER SCIATICA PRESENT: ICD-10-CM

## 2025-01-21 DIAGNOSIS — M54.50 CHRONIC BILATERAL LOW BACK PAIN, UNSPECIFIED WHETHER SCIATICA PRESENT: ICD-10-CM

## 2025-01-21 DIAGNOSIS — G44.89 OTHER HEADACHE SYNDROME: ICD-10-CM

## 2025-01-21 LAB
ALBUMIN SERPL-MCNC: 4.1 G/DL (ref 3.8–4.8)
ALP SERPL-CCNC: 102 IU/L (ref 44–121)
ALT SERPL-CCNC: 16 IU/L (ref 0–32)
AST SERPL-CCNC: 16 IU/L (ref 0–40)
BASOPHILS # BLD AUTO: 0.1 X10E3/UL (ref 0–0.2)
BASOPHILS NFR BLD AUTO: 1 %
BILIRUB SERPL-MCNC: 0.8 MG/DL (ref 0–1.2)
BUN SERPL-MCNC: 14 MG/DL (ref 8–27)
BUN/CREAT SERPL: 19 (ref 12–28)
CALCIUM SERPL-MCNC: 9.4 MG/DL (ref 8.7–10.3)
CHLORIDE SERPL-SCNC: 102 MMOL/L (ref 96–106)
CHOLEST SERPL-MCNC: 213 MG/DL (ref 100–199)
CO2 SERPL-SCNC: 24 MMOL/L (ref 20–29)
CREAT SERPL-MCNC: 0.74 MG/DL (ref 0.57–1)
EGFRCR SERPLBLD CKD-EPI 2021: 83 ML/MIN/1.73
EOSINOPHIL # BLD AUTO: 0.1 X10E3/UL (ref 0–0.4)
EOSINOPHIL NFR BLD AUTO: 2 %
ERYTHROCYTE [DISTWIDTH] IN BLOOD BY AUTOMATED COUNT: 12.2 % (ref 11.7–15.4)
GLOBULIN SER CALC-MCNC: 2.1 G/DL (ref 1.5–4.5)
GLUCOSE SERPL-MCNC: 128 MG/DL (ref 70–99)
HCT VFR BLD AUTO: 40.3 % (ref 34–46.6)
HDLC SERPL-MCNC: 66 MG/DL
HGB BLD-MCNC: 13.5 G/DL (ref 11.1–15.9)
IMM GRANULOCYTES # BLD AUTO: 0 X10E3/UL (ref 0–0.1)
IMM GRANULOCYTES NFR BLD AUTO: 0 %
IMP & REVIEW OF LAB RESULTS: NORMAL
LDLC SERPL CALC-MCNC: 118 MG/DL (ref 0–99)
LYMPHOCYTES # BLD AUTO: 2.2 X10E3/UL (ref 0.7–3.1)
LYMPHOCYTES NFR BLD AUTO: 30 %
MCH RBC QN AUTO: 30.8 PG (ref 26.6–33)
MCHC RBC AUTO-ENTMCNC: 33.5 G/DL (ref 31.5–35.7)
MCV RBC AUTO: 92 FL (ref 79–97)
MONOCYTES # BLD AUTO: 0.6 X10E3/UL (ref 0.1–0.9)
MONOCYTES NFR BLD AUTO: 9 %
NEUTROPHILS # BLD AUTO: 4.3 X10E3/UL (ref 1.4–7)
NEUTROPHILS NFR BLD AUTO: 58 %
PLATELET # BLD AUTO: 250 X10E3/UL (ref 150–450)
POTASSIUM SERPL-SCNC: 3.9 MMOL/L (ref 3.5–5.2)
PROT SERPL-MCNC: 6.2 G/DL (ref 6–8.5)
RBC # BLD AUTO: 4.38 X10E6/UL (ref 3.77–5.28)
SODIUM SERPL-SCNC: 140 MMOL/L (ref 134–144)
T4 FREE SERPL-MCNC: 1.48 NG/DL (ref 0.82–1.77)
TRIGL SERPL-MCNC: 165 MG/DL (ref 0–149)
TSH SERPL DL<=0.005 MIU/L-ACNC: 1.47 UIU/ML (ref 0.45–4.5)
VLDLC SERPL CALC-MCNC: 29 MG/DL (ref 5–40)
WBC # BLD AUTO: 7.3 X10E3/UL (ref 3.4–10.8)

## 2025-01-21 PROCEDURE — 99214 OFFICE O/P EST MOD 30 MIN: CPT | Performed by: INTERNAL MEDICINE

## 2025-01-21 ASSESSMENT — PATIENT HEALTH QUESTIONNAIRE - PHQ9
SUM OF ALL RESPONSES TO PHQ QUESTIONS 1-9: 0
5. POOR APPETITE OR OVEREATING: NOT AT ALL
2. FEELING DOWN, DEPRESSED OR HOPELESS: NOT AT ALL
SUM OF ALL RESPONSES TO PHQ QUESTIONS 1-9: 0
9. THOUGHTS THAT YOU WOULD BE BETTER OFF DEAD, OR OF HURTING YOURSELF: NOT AT ALL
6. FEELING BAD ABOUT YOURSELF - OR THAT YOU ARE A FAILURE OR HAVE LET YOURSELF OR YOUR FAMILY DOWN: NOT AT ALL
1. LITTLE INTEREST OR PLEASURE IN DOING THINGS: NOT AT ALL
4. FEELING TIRED OR HAVING LITTLE ENERGY: NOT AT ALL
3. TROUBLE FALLING OR STAYING ASLEEP: NOT AT ALL
SUM OF ALL RESPONSES TO PHQ9 QUESTIONS 1 & 2: 0
SUM OF ALL RESPONSES TO PHQ QUESTIONS 1-9: 0
8. MOVING OR SPEAKING SO SLOWLY THAT OTHER PEOPLE COULD HAVE NOTICED. OR THE OPPOSITE, BEING SO FIGETY OR RESTLESS THAT YOU HAVE BEEN MOVING AROUND A LOT MORE THAN USUAL: NOT AT ALL
SUM OF ALL RESPONSES TO PHQ QUESTIONS 1-9: 0
10. IF YOU CHECKED OFF ANY PROBLEMS, HOW DIFFICULT HAVE THESE PROBLEMS MADE IT FOR YOU TO DO YOUR WORK, TAKE CARE OF THINGS AT HOME, OR GET ALONG WITH OTHER PEOPLE: NOT DIFFICULT AT ALL
7. TROUBLE CONCENTRATING ON THINGS, SUCH AS READING THE NEWSPAPER OR WATCHING TELEVISION: NOT AT ALL

## 2025-01-23 LAB — SPECIMEN STATUS REPORT: NORMAL

## 2025-01-23 RX ORDER — OMEPRAZOLE 20 MG/1
20 CAPSULE, DELAYED RELEASE ORAL
Qty: 90 CAPSULE | Refills: 3 | OUTPATIENT
Start: 2025-01-23

## 2025-01-23 RX ORDER — ATORVASTATIN CALCIUM 10 MG/1
10 TABLET, FILM COATED ORAL DAILY
Qty: 90 TABLET | Refills: 3 | OUTPATIENT
Start: 2025-01-23

## 2025-01-23 RX ORDER — ESCITALOPRAM OXALATE 20 MG/1
20 TABLET ORAL DAILY
Qty: 90 TABLET | Refills: 3 | OUTPATIENT
Start: 2025-01-23

## 2025-01-23 RX ORDER — TOLTERODINE 4 MG/1
4 CAPSULE, EXTENDED RELEASE ORAL DAILY
Qty: 90 CAPSULE | Refills: 3 | OUTPATIENT
Start: 2025-01-23

## 2025-01-24 LAB
EST. AVERAGE GLUCOSE BLD GHB EST-MCNC: 128 MG/DL
HBA1C MFR BLD: 6.1 % (ref 4.8–5.6)

## 2025-02-04 ENCOUNTER — HOSPITAL ENCOUNTER (OUTPATIENT)
Facility: HOSPITAL | Age: 80
Discharge: HOME | End: 2025-02-07
Attending: INTERNAL MEDICINE
Payer: MEDICARE

## 2025-02-04 VITALS — BODY MASS INDEX: 37.11 KG/M2 | WEIGHT: 223 LBS

## 2025-02-04 DIAGNOSIS — R41.89 COGNITIVE CHANGE: ICD-10-CM

## 2025-02-04 DIAGNOSIS — G44.89 OTHER HEADACHE SYNDROME: ICD-10-CM

## 2025-02-04 PROCEDURE — A9579 GAD-BASE MR CONTRAST NOS,1ML: HCPCS | Performed by: RADIOLOGY

## 2025-02-04 PROCEDURE — 6360000004 HC RX CONTRAST MEDICATION: Performed by: RADIOLOGY

## 2025-02-04 PROCEDURE — 70553 MRI BRAIN STEM W/O & W/DYE: CPT

## 2025-02-04 RX ADMIN — GADOTERIDOL 20 ML: 279.3 INJECTION, SOLUTION INTRAVENOUS at 15:30

## 2025-02-05 ENCOUNTER — TELEPHONE (OUTPATIENT)
Facility: CLINIC | Age: 80
End: 2025-02-05

## 2025-02-05 NOTE — TELEPHONE ENCOUNTER
----- Message from Dr. Hoda Lugo MD sent at 2/5/2025 12:25 PM EST -----  Please let her know MRI is normal, mild chronic changes but nothing acute, and nothing serious

## 2025-02-05 NOTE — TELEPHONE ENCOUNTER
Called patient on primary number [phone] to relay message per Dr. Quick regarding MRI results. Left voicemail to return call.

## 2025-02-11 RX ORDER — LEVOTHYROXINE SODIUM 175 UG/1
TABLET ORAL
Qty: 90 TABLET | Refills: 3 | Status: SHIPPED | OUTPATIENT
Start: 2025-02-11

## 2025-03-03 DIAGNOSIS — E78.5 HYPERLIPIDEMIA LDL GOAL <100: Primary | ICD-10-CM

## 2025-03-03 RX ORDER — ATORVASTATIN CALCIUM 10 MG/1
10 TABLET, FILM COATED ORAL DAILY
Qty: 90 TABLET | Refills: 3 | Status: SHIPPED | OUTPATIENT
Start: 2025-03-03
